# Patient Record
Sex: FEMALE | Race: WHITE | NOT HISPANIC OR LATINO | URBAN - METROPOLITAN AREA
[De-identification: names, ages, dates, MRNs, and addresses within clinical notes are randomized per-mention and may not be internally consistent; named-entity substitution may affect disease eponyms.]

---

## 2017-11-06 ENCOUNTER — OUTPATIENT (OUTPATIENT)
Dept: OUTPATIENT SERVICES | Facility: HOSPITAL | Age: 72
LOS: 1 days | Discharge: HOME | End: 2017-11-06

## 2017-11-06 DIAGNOSIS — I48.0 PAROXYSMAL ATRIAL FIBRILLATION: ICD-10-CM

## 2017-11-06 DIAGNOSIS — Z02.9 ENCOUNTER FOR ADMINISTRATIVE EXAMINATIONS, UNSPECIFIED: ICD-10-CM

## 2019-05-28 PROBLEM — Z00.00 ENCOUNTER FOR PREVENTIVE HEALTH EXAMINATION: Status: ACTIVE | Noted: 2019-05-28

## 2019-06-14 ENCOUNTER — APPOINTMENT (OUTPATIENT)
Dept: CARDIOLOGY | Facility: CLINIC | Age: 74
End: 2019-06-14
Payer: MEDICARE

## 2019-06-14 PROCEDURE — 99214 OFFICE O/P EST MOD 30 MIN: CPT | Mod: 25

## 2019-06-14 PROCEDURE — 93000 ELECTROCARDIOGRAM COMPLETE: CPT

## 2019-08-30 ENCOUNTER — APPOINTMENT (OUTPATIENT)
Dept: CARDIOLOGY | Facility: CLINIC | Age: 74
End: 2019-08-30
Payer: MEDICARE

## 2019-08-30 VITALS
DIASTOLIC BLOOD PRESSURE: 60 MMHG | BODY MASS INDEX: 32.3 KG/M2 | HEART RATE: 67 BPM | SYSTOLIC BLOOD PRESSURE: 108 MMHG | WEIGHT: 201 LBS | HEIGHT: 66 IN

## 2019-08-30 DIAGNOSIS — Z87.39 PERSONAL HISTORY OF OTHER DISEASES OF THE MUSCULOSKELETAL SYSTEM AND CONNECTIVE TISSUE: ICD-10-CM

## 2019-08-30 DIAGNOSIS — E78.5 HYPERLIPIDEMIA, UNSPECIFIED: ICD-10-CM

## 2019-08-30 DIAGNOSIS — Z86.69 PERSONAL HISTORY OF OTHER DISEASES OF THE NERVOUS SYSTEM AND SENSE ORGANS: ICD-10-CM

## 2019-08-30 DIAGNOSIS — Z87.442 PERSONAL HISTORY OF URINARY CALCULI: ICD-10-CM

## 2019-08-30 DIAGNOSIS — E11.9 TYPE 2 DIABETES MELLITUS W/OUT COMPLICATIONS: ICD-10-CM

## 2019-08-30 PROCEDURE — 93000 ELECTROCARDIOGRAM COMPLETE: CPT

## 2019-08-30 PROCEDURE — 99213 OFFICE O/P EST LOW 20 MIN: CPT

## 2019-08-30 RX ORDER — GABAPENTIN 300 MG/1
300 CAPSULE ORAL
Refills: 0 | Status: ACTIVE | COMMUNITY

## 2019-08-30 RX ORDER — GLIMEPIRIDE 2 MG/1
2 TABLET ORAL DAILY
Refills: 0 | Status: ACTIVE | COMMUNITY

## 2019-08-30 RX ORDER — ALLOPURINOL 100 MG/1
100 TABLET ORAL TWICE DAILY
Refills: 0 | Status: ACTIVE | COMMUNITY

## 2019-08-30 RX ORDER — METFORMIN HYDROCHLORIDE 1000 MG/1
1000 TABLET, COATED ORAL
Qty: 30 | Refills: 3 | Status: ACTIVE | COMMUNITY

## 2019-08-30 RX ORDER — LEVOTHYROXINE SODIUM 0.12 MG/1
125 TABLET ORAL DAILY
Refills: 0 | Status: ACTIVE | COMMUNITY

## 2019-08-30 RX ORDER — METOPROLOL TARTRATE 25 MG/1
25 TABLET, FILM COATED ORAL TWICE DAILY
Qty: 180 | Refills: 3 | Status: ACTIVE | COMMUNITY

## 2019-08-30 RX ORDER — ASPIRIN ENTERIC COATED TABLETS 81 MG 81 MG/1
81 TABLET, DELAYED RELEASE ORAL DAILY
Refills: 3 | Status: ACTIVE | COMMUNITY

## 2019-08-30 RX ORDER — SITAGLIPTIN 50 MG/1
50 TABLET, FILM COATED ORAL DAILY
Refills: 0 | Status: ACTIVE | COMMUNITY

## 2019-08-30 RX ORDER — APIXABAN 5 MG/1
5 TABLET, FILM COATED ORAL
Qty: 120 | Refills: 2 | Status: ACTIVE | COMMUNITY

## 2019-08-30 NOTE — ASSESSMENT
[FreeTextEntry1] : Normal LV function now - CM resolved.\par Stress test negative.\par A. flutter. now in NSR.\par Patient reassured. Would keep on the same dose of BB. \par No cardiac contraindication to planned eye surgery.\par C/w Eliquis. Hold for 72 hours pre-op.\par c/w Metoprolol 100 mg q12, ACE-I\par C/w other medications.\par Lipid control\par BP control\par F/u in 2-3 months.

## 2019-08-30 NOTE — PHYSICAL EXAM
[General Appearance - Well Developed] : well developed [Normal Appearance] : normal appearance [Well Groomed] : well groomed [General Appearance - Well Nourished] : well nourished [No Deformities] : no deformities [General Appearance - In No Acute Distress] : no acute distress [Normal Conjunctiva] : the conjunctiva exhibited no abnormalities [Normal Oral Mucosa] : normal oral mucosa [FreeTextEntry1] : no JVD [Respiration, Rhythm And Depth] : normal respiratory rhythm and effort [Auscultation Breath Sounds / Voice Sounds] : lungs were clear to auscultation bilaterally [Heart Rate And Rhythm] : heart rate and rhythm were normal [Heart Sounds] : normal S1 and S2 [Murmurs] : no murmurs present [Edema] : no peripheral edema present [Bowel Sounds] : normal bowel sounds [Abdomen Soft] : soft [Abdomen Tenderness] : non-tender [Abnormal Walk] : normal gait [Nail Clubbing] : no clubbing of the fingernails [Cyanosis, Localized] : no localized cyanosis [Skin Color & Pigmentation] : normal skin color and pigmentation [Oriented To Time, Place, And Person] : oriented to person, place, and time [] : no rash [Affect] : the affect was normal

## 2019-08-30 NOTE — HISTORY OF PRESENT ILLNESS
[FreeTextEntry1] : 75 y/o female with a history of DM, HTN, DL, nephrolithiasis, presents for f/u of  atrial flutter.  Converted to NSR. Echo in January 2018 - normal LV function. No ischemia by stress test - 01/2018. No bleeding with Eliquis. Remains in NSR on metoprolol.  No chest pain but now reports mild dyspnea. No palpitations or syncope. Feels HR is faster when walking. Had left eye surgery, will need right eye done in October.

## 2019-12-06 ENCOUNTER — APPOINTMENT (OUTPATIENT)
Dept: CARDIOLOGY | Facility: CLINIC | Age: 74
End: 2019-12-06
Payer: MEDICARE

## 2019-12-06 VITALS
BODY MASS INDEX: 32.12 KG/M2 | DIASTOLIC BLOOD PRESSURE: 70 MMHG | WEIGHT: 199 LBS | SYSTOLIC BLOOD PRESSURE: 138 MMHG | HEART RATE: 78 BPM

## 2019-12-06 PROCEDURE — 99213 OFFICE O/P EST LOW 20 MIN: CPT

## 2019-12-06 PROCEDURE — 93000 ELECTROCARDIOGRAM COMPLETE: CPT

## 2019-12-06 NOTE — PHYSICAL EXAM
[Normal Appearance] : normal appearance [General Appearance - Well Developed] : well developed [Well Groomed] : well groomed [No Deformities] : no deformities [General Appearance - Well Nourished] : well nourished [Normal Conjunctiva] : the conjunctiva exhibited no abnormalities [General Appearance - In No Acute Distress] : no acute distress [FreeTextEntry1] : no JVD [Heart Rate And Rhythm] : heart rate and rhythm were normal [Normal Oral Mucosa] : normal oral mucosa [Edema] : no peripheral edema present [Heart Sounds] : normal S1 and S2 [Murmurs] : no murmurs present [Bowel Sounds] : normal bowel sounds [Auscultation Breath Sounds / Voice Sounds] : lungs were clear to auscultation bilaterally [Respiration, Rhythm And Depth] : normal respiratory rhythm and effort [Abdomen Tenderness] : non-tender [Abdomen Soft] : soft [Abnormal Walk] : normal gait [Skin Color & Pigmentation] : normal skin color and pigmentation [Nail Clubbing] : no clubbing of the fingernails [Cyanosis, Localized] : no localized cyanosis [Affect] : the affect was normal [Oriented To Time, Place, And Person] : oriented to person, place, and time [] : no rash

## 2019-12-06 NOTE — HISTORY OF PRESENT ILLNESS
[FreeTextEntry1] : 75 y/o female with a history of DM, HTN, DL, nephrolithiasis, presents for f/u of  atrial flutter.  Converted to NSR. Echo in January 2018 - normal LV function. No ischemia by stress test - 01/2018. No bleeding with Eliquis. Remains in NSR on metoprolol.  No chest pain but now reports mild dyspnea. No palpitations or syncope. Had eye surgery, uneventful.

## 2019-12-06 NOTE — ASSESSMENT
[FreeTextEntry1] : Normal LV function now - CM resolved.\par Non-specific ST changes. Asymptomatic, her nuclear stress test was negative.\par A. flutter. now in NSR.\par Patient reassured. Would keep on the same dose of BB. \par \par C/w Eliquis. \par c/w Metoprolol 100 mg q12, ACE-I\par C/w other medications.\par Lipid control\par BP control\par F/u in 6 months.

## 2020-06-05 ENCOUNTER — APPOINTMENT (OUTPATIENT)
Dept: CARDIOLOGY | Facility: CLINIC | Age: 75
End: 2020-06-05
Payer: MEDICARE

## 2020-06-05 VITALS
DIASTOLIC BLOOD PRESSURE: 60 MMHG | BODY MASS INDEX: 32.44 KG/M2 | WEIGHT: 201 LBS | HEART RATE: 70 BPM | SYSTOLIC BLOOD PRESSURE: 136 MMHG

## 2020-06-05 PROCEDURE — 99213 OFFICE O/P EST LOW 20 MIN: CPT

## 2020-06-05 PROCEDURE — 93000 ELECTROCARDIOGRAM COMPLETE: CPT

## 2020-06-05 NOTE — HISTORY OF PRESENT ILLNESS
[FreeTextEntry1] : 73 y/o female with a history of DM, HTN, DL, nephrolithiasis, presents for f/u of  atrial flutter.  Converted to NSR. Echo in January 2018 - normal LV function. No ischemia by stress test - 01/2018. No bleeding with Eliquis. Remains in NSR on metoprolol.  No chest pain but now reports mild dyspnea. No palpitations or syncope.

## 2020-06-05 NOTE — ASSESSMENT
[FreeTextEntry1] : Normal LV function now - CM resolved.\par Non-specific ST changes. Asymptomatic, her nuclear stress test was negative.\par Paroxysmal A. flutter. now in NSR.\par Patient reassured. Would keep on the same dose of BB. \par \par C/w Eliquis. \par c/w Metoprolol 100 mg q12, ACE-I\par C/w other medications.\par Lipid control\par BP control\par echo next year\par F/u in 4-6 months.

## 2020-10-23 ENCOUNTER — APPOINTMENT (OUTPATIENT)
Dept: CARDIOLOGY | Facility: CLINIC | Age: 75
End: 2020-10-23
Payer: MEDICARE

## 2020-10-23 VITALS
HEART RATE: 79 BPM | WEIGHT: 203 LBS | SYSTOLIC BLOOD PRESSURE: 120 MMHG | BODY MASS INDEX: 31.86 KG/M2 | DIASTOLIC BLOOD PRESSURE: 77 MMHG | TEMPERATURE: 97.6 F | HEIGHT: 67 IN

## 2020-10-23 PROCEDURE — 99213 OFFICE O/P EST LOW 20 MIN: CPT

## 2020-10-23 PROCEDURE — 93000 ELECTROCARDIOGRAM COMPLETE: CPT

## 2020-10-23 NOTE — PHYSICAL EXAM
[General Appearance - Well Developed] : well developed [Normal Appearance] : normal appearance [Well Groomed] : well groomed [General Appearance - Well Nourished] : well nourished [No Deformities] : no deformities [General Appearance - In No Acute Distress] : no acute distress [Normal Conjunctiva] : the conjunctiva exhibited no abnormalities [FreeTextEntry1] : no JVD [Respiration, Rhythm And Depth] : normal respiratory rhythm and effort [Auscultation Breath Sounds / Voice Sounds] : lungs were clear to auscultation bilaterally [Heart Rate And Rhythm] : heart rate and rhythm were normal [Heart Sounds] : normal S1 and S2 [Murmurs] : no murmurs present [Edema] : no peripheral edema present [Bowel Sounds] : normal bowel sounds [Abdomen Soft] : soft [Abdomen Tenderness] : non-tender [Abnormal Walk] : normal gait [Nail Clubbing] : no clubbing of the fingernails [Cyanosis, Localized] : no localized cyanosis [Skin Color & Pigmentation] : normal skin color and pigmentation [] : no rash [Oriented To Time, Place, And Person] : oriented to person, place, and time [Affect] : the affect was normal

## 2020-10-23 NOTE — HISTORY OF PRESENT ILLNESS
[FreeTextEntry1] : 5 y/o female with a history of DM, HTN, DL, nephrolithiasis, presents for f/u of  atrial flutter.  Converted to NSR. Echo in January 2018 - normal LV function. No ischemia by stress test - 01/2018. No bleeding with Eliquis. Remains in NSR on metoprolol.  No chest pain but now reports mild dyspnea. No palpitations or syncope.

## 2020-10-23 NOTE — ASSESSMENT
[FreeTextEntry1] : Normal LV function now - CM resolved.\par Non-specific ST changes. Asymptomatic, her nuclear stress test was negative.\par Paroxysmal A. flutter. now in NSR.\par Patient reassured. Would keep on the same dose of BB. \par \par C/w Eliquis. \par c/w Metoprolol 100 mg q12, ACE-I\par C/w other medications.\par Lipid control\par BP control\par echo next year\par F/u in 6 months.

## 2021-04-23 ENCOUNTER — APPOINTMENT (OUTPATIENT)
Dept: CARDIOLOGY | Facility: CLINIC | Age: 76
End: 2021-04-23
Payer: MEDICARE

## 2021-04-23 VITALS
HEIGHT: 67 IN | OXYGEN SATURATION: 98 % | RESPIRATION RATE: 18 BRPM | TEMPERATURE: 97.3 F | HEART RATE: 68 BPM | DIASTOLIC BLOOD PRESSURE: 80 MMHG | WEIGHT: 202 LBS | BODY MASS INDEX: 31.71 KG/M2 | SYSTOLIC BLOOD PRESSURE: 150 MMHG

## 2021-04-23 PROCEDURE — 99214 OFFICE O/P EST MOD 30 MIN: CPT

## 2021-04-23 PROCEDURE — 93000 ELECTROCARDIOGRAM COMPLETE: CPT

## 2021-04-23 NOTE — HISTORY OF PRESENT ILLNESS
[FreeTextEntry1] : 76 y/o female with a history of DM, HTN, DL, nephrolithiasis, presents for f/u of  atrial flutter.  Converted to NSR. Echo in January 2018 - normal LV function. No ischemia by stress test - 01/2018. No bleeding with Eliquis. Remains in NSR on metoprolol.  She reports exertional dyspnea at 1 block, mild chest discomfort. No palpitations or syncope.

## 2021-04-23 NOTE — ASSESSMENT
[FreeTextEntry1] : Normal LV function now - CM resolved.\par Non-specific ST changes. \par Paroxysmal A. flutter. now in NSR.\par Patient reassured. Would keep on the same dose of BB. \par Schedule for nuclear stress test and 2D echo.\par \par C/w Eliquis. \par c/w Metoprolol 100 mg q12, ACE-I\par C/w other medications.\par Lipid control\par BP control\par \par F/u in 6 months.

## 2021-06-02 ENCOUNTER — APPOINTMENT (OUTPATIENT)
Dept: CARDIOLOGY | Facility: CLINIC | Age: 76
End: 2021-06-02
Payer: MEDICARE

## 2021-06-02 VITALS
SYSTOLIC BLOOD PRESSURE: 130 MMHG | OXYGEN SATURATION: 97 % | HEIGHT: 67 IN | BODY MASS INDEX: 31.71 KG/M2 | RESPIRATION RATE: 18 BRPM | HEART RATE: 73 BPM | WEIGHT: 202 LBS | DIASTOLIC BLOOD PRESSURE: 80 MMHG | TEMPERATURE: 97 F

## 2021-06-02 PROCEDURE — 93000 ELECTROCARDIOGRAM COMPLETE: CPT

## 2021-06-02 PROCEDURE — 99214 OFFICE O/P EST MOD 30 MIN: CPT

## 2021-06-02 NOTE — PHYSICAL EXAM
[General Appearance - Well Developed] : well developed [Normal Appearance] : normal appearance [Well Groomed] : well groomed [General Appearance - Well Nourished] : well nourished [No Deformities] : no deformities [General Appearance - In No Acute Distress] : no acute distress [Normal Conjunctiva] : the conjunctiva exhibited no abnormalities [FreeTextEntry1] : no JVD [Respiration, Rhythm And Depth] : normal respiratory rhythm and effort [Auscultation Breath Sounds / Voice Sounds] : lungs were clear to auscultation bilaterally [Heart Rate And Rhythm] : heart rate and rhythm were normal [Heart Sounds] : normal S1 and S2 [Murmurs] : no murmurs present [Edema] : no peripheral edema present [Bowel Sounds] : normal bowel sounds [Abdomen Soft] : soft [Abdomen Tenderness] : non-tender [Abnormal Walk] : normal gait [Nail Clubbing] : no clubbing of the fingernails [Cyanosis, Localized] : no localized cyanosis [Skin Color & Pigmentation] : normal skin color and pigmentation [] : no rash [Affect] : the affect was normal [Oriented To Time, Place, And Person] : oriented to person, place, and time

## 2021-06-02 NOTE — ASSESSMENT
[FreeTextEntry1] : Normal LV function now - CM resolved.\par Non-specific ST changes. \par Paroxysmal A. flutter. now in NSR.\par Patient reassured. Would keep on the same dose of BB. \par Abnormal nuclear stress test c/w LAD disease.\par Findings discussed with the patient.\par Recommend cath - risks and benefits discussed in detail. Schedule for cath.\par In the meantime, I have also advised the patient to go to the nearest emergency room if she experiences any chest pain, dyspnea, syncope, or has any other compelling symptoms.\par \par \par C/w Eliquis. \par c/w Metoprolol 100 mg q12, ACE-I\par Add isosorbide\par C/w other medications.\par Lipid control\par BP control\par \par F/u after the procedure.

## 2021-06-02 NOTE — REASON FOR VISIT
[Arrhythmia/ECG Abnorrmalities] : arrhythmia/ECG abnormalities [Coronary Artery Disease] : coronary artery disease [Follow-Up - Clinic] : a clinic follow-up of [Atrial Fibrillation] : atrial fibrillation

## 2021-06-02 NOTE — HISTORY OF PRESENT ILLNESS
[FreeTextEntry1] : 74 y/o female with a history of DM, HTN, DL, nephrolithiasis, presents for f/u of  atrial flutter and chest pain.  Remains in NSR.  Echo in January 2018 - normal LV function. Large area of ischemia by stress test, c/w LAD disease. No bleeding with Eliquis. Remains in NSR on metoprolol.  She reports exertional dyspnea at 1 block, still has chest discomfort. No palpitations or syncope.

## 2021-06-02 NOTE — REVIEW OF SYSTEMS
[Feeling Fatigued] : feeling fatigued [Dyspnea on exertion] : dyspnea during exertion [Chest Discomfort] : chest discomfort [Negative] : Heme/Lymph

## 2021-06-28 ENCOUNTER — APPOINTMENT (OUTPATIENT)
Dept: DISASTER EMERGENCY | Facility: CLINIC | Age: 76
End: 2021-06-28

## 2021-06-29 LAB — SARS-COV-2 N GENE NPH QL NAA+PROBE: NOT DETECTED

## 2021-07-01 ENCOUNTER — OUTPATIENT (OUTPATIENT)
Dept: OUTPATIENT SERVICES | Facility: HOSPITAL | Age: 76
LOS: 1 days | Discharge: HOME | End: 2021-07-01
Payer: MEDICARE

## 2021-07-01 DIAGNOSIS — Z98.49 CATARACT EXTRACTION STATUS, UNSPECIFIED EYE: Chronic | ICD-10-CM

## 2021-07-01 LAB
ANION GAP SERPL CALC-SCNC: 12 MMOL/L — SIGNIFICANT CHANGE UP (ref 7–14)
BUN SERPL-MCNC: 20 MG/DL — SIGNIFICANT CHANGE UP (ref 10–20)
CALCIUM SERPL-MCNC: 10.5 MG/DL — HIGH (ref 8.5–10.1)
CHLORIDE SERPL-SCNC: 103 MMOL/L — SIGNIFICANT CHANGE UP (ref 98–110)
CO2 SERPL-SCNC: 27 MMOL/L — SIGNIFICANT CHANGE UP (ref 17–32)
CREAT SERPL-MCNC: 1 MG/DL — SIGNIFICANT CHANGE UP (ref 0.7–1.5)
GLUCOSE BLDC GLUCOMTR-MCNC: 127 MG/DL — HIGH (ref 70–99)
GLUCOSE SERPL-MCNC: 136 MG/DL — HIGH (ref 70–99)
HCT VFR BLD CALC: 36.4 % — LOW (ref 37–47)
HCT VFR BLD CALC: 44.5 % — SIGNIFICANT CHANGE UP (ref 37–47)
HGB BLD-MCNC: 12.2 G/DL — SIGNIFICANT CHANGE UP (ref 12–16)
HGB BLD-MCNC: 14.8 G/DL — SIGNIFICANT CHANGE UP (ref 12–16)
MCHC RBC-ENTMCNC: 29.6 PG — SIGNIFICANT CHANGE UP (ref 27–31)
MCHC RBC-ENTMCNC: 29.8 PG — SIGNIFICANT CHANGE UP (ref 27–31)
MCHC RBC-ENTMCNC: 33.3 G/DL — SIGNIFICANT CHANGE UP (ref 32–37)
MCHC RBC-ENTMCNC: 33.5 G/DL — SIGNIFICANT CHANGE UP (ref 32–37)
MCV RBC AUTO: 88.8 FL — SIGNIFICANT CHANGE UP (ref 81–99)
MCV RBC AUTO: 89 FL — SIGNIFICANT CHANGE UP (ref 81–99)
NRBC # BLD: 0 /100 WBCS — SIGNIFICANT CHANGE UP (ref 0–0)
NRBC # BLD: 0 /100 WBCS — SIGNIFICANT CHANGE UP (ref 0–0)
PLATELET # BLD AUTO: 260 K/UL — SIGNIFICANT CHANGE UP (ref 130–400)
PLATELET # BLD AUTO: 333 K/UL — SIGNIFICANT CHANGE UP (ref 130–400)
POTASSIUM SERPL-MCNC: 4.2 MMOL/L — SIGNIFICANT CHANGE UP (ref 3.5–5)
POTASSIUM SERPL-SCNC: 4.2 MMOL/L — SIGNIFICANT CHANGE UP (ref 3.5–5)
RBC # BLD: 4.1 M/UL — LOW (ref 4.2–5.4)
RBC # BLD: 5 M/UL — SIGNIFICANT CHANGE UP (ref 4.2–5.4)
RBC # FLD: 13.9 % — SIGNIFICANT CHANGE UP (ref 11.5–14.5)
RBC # FLD: 14 % — SIGNIFICANT CHANGE UP (ref 11.5–14.5)
SODIUM SERPL-SCNC: 142 MMOL/L — SIGNIFICANT CHANGE UP (ref 135–146)
WBC # BLD: 11.37 K/UL — HIGH (ref 4.8–10.8)
WBC # BLD: 9.33 K/UL — SIGNIFICANT CHANGE UP (ref 4.8–10.8)
WBC # FLD AUTO: 11.37 K/UL — HIGH (ref 4.8–10.8)
WBC # FLD AUTO: 9.33 K/UL — SIGNIFICANT CHANGE UP (ref 4.8–10.8)

## 2021-07-01 PROCEDURE — 92928 PRQ TCAT PLMT NTRAC ST 1 LES: CPT | Mod: LD

## 2021-07-01 PROCEDURE — 93458 L HRT ARTERY/VENTRICLE ANGIO: CPT | Mod: 26,XU

## 2021-07-01 PROCEDURE — 93010 ELECTROCARDIOGRAM REPORT: CPT | Mod: 59

## 2021-07-01 RX ORDER — GABAPENTIN 400 MG/1
1 CAPSULE ORAL
Qty: 0 | Refills: 0 | DISCHARGE

## 2021-07-01 RX ORDER — CLOPIDOGREL BISULFATE 75 MG/1
1 TABLET, FILM COATED ORAL
Qty: 30 | Refills: 0
Start: 2021-07-01 | End: 2021-07-30

## 2021-07-01 RX ORDER — CHOLECALCIFEROL (VITAMIN D3) 125 MCG
0 CAPSULE ORAL
Qty: 0 | Refills: 0 | DISCHARGE

## 2021-07-01 RX ORDER — EZETIMIBE 10 MG/1
1 TABLET ORAL
Qty: 30 | Refills: 0
Start: 2021-07-01 | End: 2021-07-30

## 2021-07-01 RX ORDER — METOPROLOL TARTRATE 50 MG
1 TABLET ORAL
Qty: 0 | Refills: 0 | DISCHARGE

## 2021-07-01 RX ORDER — APIXABAN 2.5 MG/1
1 TABLET, FILM COATED ORAL
Qty: 0 | Refills: 0 | DISCHARGE

## 2021-07-01 RX ORDER — LEVOTHYROXINE SODIUM 125 MCG
1 TABLET ORAL
Qty: 0 | Refills: 0 | DISCHARGE

## 2021-07-01 RX ORDER — ALLOPURINOL 300 MG
1 TABLET ORAL
Qty: 0 | Refills: 0 | DISCHARGE

## 2021-07-01 RX ORDER — METFORMIN HYDROCHLORIDE 850 MG/1
1 TABLET ORAL
Qty: 0 | Refills: 0 | DISCHARGE

## 2021-07-01 RX ORDER — ASPIRIN/CALCIUM CARB/MAGNESIUM 324 MG
1 TABLET ORAL
Qty: 0 | Refills: 0 | DISCHARGE

## 2021-07-01 RX ORDER — GLIMEPIRIDE 1 MG
1 TABLET ORAL
Qty: 0 | Refills: 0 | DISCHARGE

## 2021-07-01 RX ORDER — SITAGLIPTIN 50 MG/1
1 TABLET, FILM COATED ORAL
Qty: 0 | Refills: 0 | DISCHARGE

## 2021-07-01 NOTE — ASU PATIENT PROFILE, ADULT - PMH
Age-related incipient cataract of both eyes    Hypertension, unspecified type    Nephrolithiasis    Type 2 diabetes mellitus without complication, without long-term current use of insulin

## 2021-07-01 NOTE — H&P CARDIOLOGY - HISTORY OF PRESENT ILLNESS
HPI    75 year old lady with PMHx DM, HTN, DLD presenting for [facility] after having episode of AFlutter and NST showing ST depressions in II, III, aVF and V5-V6      REVIEW OF SYSTEMS:  CONSTITUTIONAL: No weakness, fevers or chills  EYES/ENT: No visual changes;  No vertigo or throat pain   NECK: No pain or stiffness  RESPIRATORY: No cough, wheezing, hemoptysis; SEE HPI  CARDIOVASCULAR: SEE HPI  GASTROINTESTINAL: No abdominal or epigastric pain. No nausea, vomiting, or hematemesis; No diarrhea or constipation. No melena or hematochezia.  GENITOURINARY: No dysuria, frequency or hematuria  NEUROLOGICAL: No numbness or weakness  SKIN: No itching, rashes      PHYSICAL EXAM:  T(C): --  HR: --  BP: --  RR: --  SpO2: --  GENERAL: NAD  HEAD:  Atraumatic, Normocephalic  EYES: conjunctiva and sclera clear  NECK: No JVD  CHEST/LUNG: Clear to auscultation bilaterally; No wheeze  HEART: Regular rate and rhythm; No murmurs  ABDOMEN: Soft, Nontender, Nondistended; Bowel sounds present  EXTREMITIES:  2+ Peripheral Pulses, No clubbing, cyanosis, or edema  NEUROLOGY:  A&Ox3, appropriate  SKIN: No rashes or lesions    RIGHT RADIAL ARTERY EVALUATION:  ROBYN TEST: [X] Negative          [] Positive  BARBEAU TEST: [X] Class A           [] Class B           [] Class C            [] Class D         HPI    75 year old lady with PMHx DM, HTN, DLD presenting for [facility] after having episode of AFlutter and NST showing ST depressions in II, III, aVF and V5-V6, positive for anterior ischemia      REVIEW OF SYSTEMS:  CONSTITUTIONAL: No weakness, fevers or chills  EYES/ENT: No visual changes;  No vertigo or throat pain   NECK: No pain or stiffness  RESPIRATORY: No cough, wheezing, hemoptysis; SEE HPI  CARDIOVASCULAR: SEE HPI  GASTROINTESTINAL: No abdominal or epigastric pain. No nausea, vomiting, or hematemesis; No diarrhea or constipation. No melena or hematochezia.  GENITOURINARY: No dysuria, frequency or hematuria  NEUROLOGICAL: No numbness or weakness  SKIN: No itching, rashes      PHYSICAL EXAM:  T(C): --  HR: --  BP: --  RR: --  SpO2: --  GENERAL: NAD  HEAD:  Atraumatic, Normocephalic  EYES: conjunctiva and sclera clear  NECK: No JVD  CHEST/LUNG: Clear to auscultation bilaterally; No wheeze  HEART: Regular rate and rhythm; No murmurs  ABDOMEN: Soft, Nontender, Nondistended; Bowel sounds present  EXTREMITIES:  2+ Peripheral Pulses, No clubbing, cyanosis, or edema  NEUROLOGY:  A&Ox3, appropriate  SKIN: No rashes or lesions    RIGHT RADIAL ARTERY EVALUATION:  BARBEAU TEST: [X] Class A           [] Class B           [] Class C            [] Class D

## 2021-07-01 NOTE — PROGRESS NOTE ADULT - SUBJECTIVE AND OBJECTIVE BOX
Cardiology Follow up    MARYANN LOFTON   76y Female  PAST MEDICAL & SURGICAL HISTORY:  Type 2 diabetes mellitus without complication, without long-term current use of insulin    Hypertension, unspecified type    Nephrolithiasis    Age-related incipient cataract of both eyes    H/O cataract removal with insertion of prosthetic lens         HPI:  HPI    75 year old lady with PMHx DM, HTN, DLD presenting for [facility] after having episode of AFlutter and NST showing ST depressions in II, III, aVF and V5-V6, positive for anterior ischemia      REVIEW OF SYSTEMS:  CONSTITUTIONAL: No weakness, fevers or chills  EYES/ENT: No visual changes;  No vertigo or throat pain   NECK: No pain or stiffness  RESPIRATORY: No cough, wheezing, hemoptysis; SEE HPI  CARDIOVASCULAR: SEE HPI  GASTROINTESTINAL: No abdominal or epigastric pain. No nausea, vomiting, or hematemesis; No diarrhea or constipation. No melena or hematochezia.  GENITOURINARY: No dysuria, frequency or hematuria  NEUROLOGICAL: No numbness or weakness  SKIN: No itching, rashes      PHYSICAL EXAM:  T(C): --  HR: --  BP: --  RR: --  SpO2: --  GENERAL: NAD  HEAD:  Atraumatic, Normocephalic  EYES: conjunctiva and sclera clear  NECK: No JVD  CHEST/LUNG: Clear to auscultation bilaterally; No wheeze  HEART: Regular rate and rhythm; No murmurs  ABDOMEN: Soft, Nontender, Nondistended; Bowel sounds present  EXTREMITIES:  2+ Peripheral Pulses, No clubbing, cyanosis, or edema  NEUROLOGY:  A&Ox3, appropriate  SKIN: No rashes or lesions    RIGHT RADIAL ARTERY EVALUATION:  BARBEAU TEST: [X] Class A           [] Class B           [] Class C            [] Class D         (01 Jul 2021 11:29)    Allergies    Ancef (Anaphylaxis)  Sulfacetamide Sodium (Unknown)    Intolerances    Patient examined in stretcher  Patient without complaints.   Denies CP, SOB, palpitations, or dizziness    Vital Signs   HR: 95  BP: 142/58  RR: 17  SpO2: 95% on RA    MEDICATIONS  (STANDING):    MEDICATIONS  (PRN):      REVIEW OF SYSTEMS:          All negative except as mentioned in HPI    PHYSICAL EXAM:           CONSTITUTIONAL: Well-developed; well-nourished; in no acute distress  	SKIN: warm, dry  	HEAD: Normocephalic; atraumatic  	EYES: PERRL.  	ENT: No nasal discharge, airway clear, mucous membranes moist  	NECK: Supple; non tender.  	CARD: +S1, +S2, no murmurs, gallops, or rubs. irregular rate and rhythm    	RESP: No wheezes, rales or rhonchi. CTA B/L  	ABD: soft ntnd, + BS x 4 quadrants  	EXT: moves all extremities,  no clubbing, cyanosis or edema  	NEURO: Alert and oriented x3, no focal deficits          PSYCH: Cooperative, appropriate          VASCULAR:  + Rad / + PTs / + DPs          EXTREMITY:             Right Radial: D-stat in place, access site soft, no hematoma, no pain, + pulses, no sign of infection, no numbness            ECG: pending    LABS:                        14.8   11.37 )-----------( 333      ( 01 Jul 2021 10:34 )             44.5     07-01    142  |  103  |  20  ----------------------------<  136<H>  4.2   |  27  |  1.0    Ca    10.5<H>      01 Jul 2021 10:34    A/P:  I discussed the case with Cardiologist Dr. Anderson  and recommend the following:    S/P PCI today:    COBRA stent x1 to mLAD    	     Continue DAPT ( Aspirin 81 mg PO Daily and Plavix 75 mg daily ),  B-Blocker, enalapril                   Patient given 30 day supply of ( Aspirin 81 mg daily and Plavix 75 mg daily ) to take at home                   HOLD Eliquis x 1month while on Aspirin/Plavix, after 1 month restart Eliquis and stop Aspirin                   START Zetia 10mg po, patient intolerant to statins                   HOLD metformin x48hrs post cardiac cath                   CBC at 17:00pm                    EKG prior to d/c at 17:00pm                   NS at 100ml ml/hr x 6hrs                   Monitor access site                   Patient agreeing to take DAPT for at least one year or as directed by cardiologist                    Pt given instructions on importance of taking antiplatelet medication or risk acute stent thrombosis/death                   Post cath instructions, access site care and activity restrictions reviewed with patient                     Discussed with patient to return to hospital if experience chest pain, shortness breath, dizziness and site bleeding                   Aggressive risk factor modification, diet counseling, smoking cessation discussed with patient                    Cardiac rehab info provided/referral and communication to cardiac rehab provided                      Can discharge patient at 19:00pm from cardiac standpoint after ambulating without symptoms, access site wnl, labs and ECG reviewed                    Follow up with Cardiology Dr. Anderson in two weeks.  Instructed to call and make an appointment                                       Cardiology Follow up    MARYANN LOFTON   76y Female  PAST MEDICAL & SURGICAL HISTORY:  Type 2 diabetes mellitus without complication, without long-term current use of insulin    Hypertension, unspecified type    Nephrolithiasis    Age-related incipient cataract of both eyes    H/O cataract removal with insertion of prosthetic lens         HPI:  HPI    75 year old lady with PMHx DM, HTN, DLD presenting for [facility] after having episode of AFlutter and NST showing ST depressions in II, III, aVF and V5-V6, positive for anterior ischemia      REVIEW OF SYSTEMS:  CONSTITUTIONAL: No weakness, fevers or chills  EYES/ENT: No visual changes;  No vertigo or throat pain   NECK: No pain or stiffness  RESPIRATORY: No cough, wheezing, hemoptysis; SEE HPI  CARDIOVASCULAR: SEE HPI  GASTROINTESTINAL: No abdominal or epigastric pain. No nausea, vomiting, or hematemesis; No diarrhea or constipation. No melena or hematochezia.  GENITOURINARY: No dysuria, frequency or hematuria  NEUROLOGICAL: No numbness or weakness  SKIN: No itching, rashes      PHYSICAL EXAM:  T(C): --  HR: --  BP: --  RR: --  SpO2: --  GENERAL: NAD  HEAD:  Atraumatic, Normocephalic  EYES: conjunctiva and sclera clear  NECK: No JVD  CHEST/LUNG: Clear to auscultation bilaterally; No wheeze  HEART: Regular rate and rhythm; No murmurs  ABDOMEN: Soft, Nontender, Nondistended; Bowel sounds present  EXTREMITIES:  2+ Peripheral Pulses, No clubbing, cyanosis, or edema  NEUROLOGY:  A&Ox3, appropriate  SKIN: No rashes or lesions    RIGHT RADIAL ARTERY EVALUATION:  BARBEAU TEST: [X] Class A           [] Class B           [] Class C            [] Class D         (01 Jul 2021 11:29)    Allergies    Ancef (Anaphylaxis)  Sulfacetamide Sodium (Unknown)    Intolerances    Patient examined in stretcher  Patient without complaints.   Denies CP, SOB, palpitations, or dizziness    Vital Signs   HR: 95  BP: 142/58  RR: 17  SpO2: 95% on RA    MEDICATIONS  (STANDING):    MEDICATIONS  (PRN):      REVIEW OF SYSTEMS:          All negative except as mentioned in HPI    PHYSICAL EXAM:           CONSTITUTIONAL: Well-developed; well-nourished; in no acute distress  	SKIN: warm, dry  	HEAD: Normocephalic; atraumatic  	EYES: PERRL.  	ENT: No nasal discharge, airway clear, mucous membranes moist  	NECK: Supple; non tender.  	CARD: +S1, +S2, no murmurs, gallops, or rubs. regular rate and rhythm    	RESP: No wheezes, rales or rhonchi. CTA B/L  	ABD: soft ntnd, + BS x 4 quadrants  	EXT: moves all extremities,  no clubbing, cyanosis or edema  	NEURO: Alert and oriented x3, no focal deficits          PSYCH: Cooperative, appropriate          VASCULAR:  + Rad / + PTs / + DPs          EXTREMITY:             Right Radial: D-stat in place, access site soft, no hematoma, no pain, + pulses, no sign of infection, no numbness            ECG: pending    LABS:                        14.8   11.37 )-----------( 333      ( 01 Jul 2021 10:34 )             44.5     07-01    142  |  103  |  20  ----------------------------<  136<H>  4.2   |  27  |  1.0    Ca    10.5<H>      01 Jul 2021 10:34    A/P:  I discussed the case with Cardiologist Dr. Anderson  and recommend the following:    S/P PCI today:    COBRA stent x1 to mLAD    	     Continue DAPT ( Aspirin 81 mg PO Daily and Plavix 75 mg daily ),  B-Blocker, enalapril                   Patient given 30 day supply of ( Aspirin 81 mg daily and Plavix 75 mg daily ) to take at home                   HOLD Eliquis x 1month while on Aspirin/Plavix, after 1 month restart Eliquis and stop Aspirin                   START Zetia 10mg po, patient intolerant to statins                   HOLD metformin x48hrs post cardiac cath                   CBC at 17:00pm                    EKG prior to d/c at 17:00pm                   NS at 100ml ml/hr x 6hrs                   Monitor access site                   Patient agreeing to take DAPT for at least one year or as directed by cardiologist                    Pt given instructions on importance of taking antiplatelet medication or risk acute stent thrombosis/death                   Post cath instructions, access site care and activity restrictions reviewed with patient                     Discussed with patient to return to hospital if experience chest pain, shortness breath, dizziness and site bleeding                   Aggressive risk factor modification, diet counseling, smoking cessation discussed with patient                    Cardiac rehab info provided/referral and communication to cardiac rehab provided                      Can discharge patient at 19:00pm from cardiac standpoint after ambulating without symptoms, access site wnl, labs and ECG reviewed                    Follow up with Cardiology Dr. Anderson in two weeks.  Instructed to call and make an appointment                                       Cardiology Follow up    MARYANN LOFTON   76y Female  PAST MEDICAL & SURGICAL HISTORY:  Type 2 diabetes mellitus without complication, without long-term current use of insulin    Hypertension, unspecified type    Nephrolithiasis    Age-related incipient cataract of both eyes    H/O cataract removal with insertion of prosthetic lens         HPI:  HPI    75 year old lady with PMHx DM, HTN, DLD presenting for [facility] after having episode of AFlutter and NST showing ST depressions in II, III, aVF and V5-V6, positive for anterior ischemia      REVIEW OF SYSTEMS:  CONSTITUTIONAL: No weakness, fevers or chills  EYES/ENT: No visual changes;  No vertigo or throat pain   NECK: No pain or stiffness  RESPIRATORY: No cough, wheezing, hemoptysis; SEE HPI  CARDIOVASCULAR: SEE HPI  GASTROINTESTINAL: No abdominal or epigastric pain. No nausea, vomiting, or hematemesis; No diarrhea or constipation. No melena or hematochezia.  GENITOURINARY: No dysuria, frequency or hematuria  NEUROLOGICAL: No numbness or weakness  SKIN: No itching, rashes      PHYSICAL EXAM:  T(C): --  HR: --  BP: --  RR: --  SpO2: --  GENERAL: NAD  HEAD:  Atraumatic, Normocephalic  EYES: conjunctiva and sclera clear  NECK: No JVD  CHEST/LUNG: Clear to auscultation bilaterally; No wheeze  HEART: Regular rate and rhythm; No murmurs  ABDOMEN: Soft, Nontender, Nondistended; Bowel sounds present  EXTREMITIES:  2+ Peripheral Pulses, No clubbing, cyanosis, or edema  NEUROLOGY:  A&Ox3, appropriate  SKIN: No rashes or lesions    RIGHT RADIAL ARTERY EVALUATION:  BARBEAU TEST: [X] Class A           [] Class B           [] Class C            [] Class D         (01 Jul 2021 11:29)    Allergies    Ancef (Anaphylaxis)  Sulfacetamide Sodium (Unknown)    Intolerances    Patient examined in stretcher  Patient without complaints.   Denies CP, SOB, palpitations, or dizziness    Vital Signs   HR: 95  BP: 142/58  RR: 17  SpO2: 95% on RA    MEDICATIONS  (STANDING):    MEDICATIONS  (PRN):      REVIEW OF SYSTEMS:          All negative except as mentioned in HPI    PHYSICAL EXAM:           CONSTITUTIONAL: Well-developed; well-nourished; in no acute distress  	SKIN: warm, dry  	HEAD: Normocephalic; atraumatic  	EYES: PERRL.  	ENT: No nasal discharge, airway clear, mucous membranes moist  	NECK: Supple; non tender.  	CARD: +S1, +S2, no murmurs, gallops, or rubs. regular rate and rhythm    	RESP: No wheezes, rales or rhonchi. CTA B/L  	ABD: soft ntnd, + BS x 4 quadrants  	EXT: moves all extremities,  no clubbing, cyanosis or edema  	NEURO: Alert and oriented x3, no focal deficits          PSYCH: Cooperative, appropriate          VASCULAR:  + Rad / + PTs / + DPs          EXTREMITY:             Right Radial: D-stat in place, access site soft, no hematoma, no pain, + pulses, no sign of infection, no numbness            ECG: pending    LABS:                        14.8   11.37 )-----------( 333      ( 01 Jul 2021 10:34 )             44.5     07-01    142  |  103  |  20  ----------------------------<  136<H>  4.2   |  27  |  1.0    Ca    10.5<H>      01 Jul 2021 10:34    A/P:  I discussed the case with Cardiologist Dr. Anderson  and recommend the following:    S/P PCI today:    COBRA stent x1 to mLAD    	     Continue DAPT ( Aspirin 81 mg PO Daily and Plavix 75 mg daily ),  B-Blocker, enalapril                   Patient given 30 day supply of ( Aspirin 81 mg daily and Plavix 75 mg daily ) to take at home                   HOLD Eliquis x 1month while on Aspirin/Plavix, after 1 month restart Eliquis and stop Plavix                   START Zetia 10mg po, patient intolerant to statins                   HOLD metformin x48hrs post cardiac cath                   CBC at 17:00pm                    EKG prior to d/c at 17:00pm                   NS at 100ml ml/hr x 6hrs                   Monitor access site                   Patient agreeing to take DAPT for at least one year or as directed by cardiologist                    Pt given instructions on importance of taking antiplatelet medication or risk acute stent thrombosis/death                   Post cath instructions, access site care and activity restrictions reviewed with patient                     Discussed with patient to return to hospital if experience chest pain, shortness breath, dizziness and site bleeding                   Aggressive risk factor modification, diet counseling, smoking cessation discussed with patient                    Cardiac rehab info provided/referral and communication to cardiac rehab provided                      Can discharge patient at 19:00pm from cardiac standpoint after ambulating without symptoms, access site wnl, labs and ECG reviewed                    Follow up with Cardiology Dr. Anderson in two weeks.  Instructed to call and make an appointment                                       Cardiology Follow up    MARYANN LOFTON     76y Female  PAST MEDICAL & SURGICAL HISTORY:  Type 2 diabetes mellitus without complication, without long-term current use of insulin    Hypertension, unspecified type    Nephrolithiasis    Age-related incipient cataract of both eyes    H/O cataract removal with insertion of prosthetic lens         HPI:    HPI    75 year old lady with PMHx DM, HTN, DLD presenting for [facility] after having episode of AFlutter and NST showing ST depressions in II, III, aVF and V5-V6, positive for anterior ischemia      REVIEW OF SYSTEMS:  CONSTITUTIONAL: No weakness, fevers or chills  EYES/ENT: No visual changes;  No vertigo or throat pain   NECK: No pain or stiffness  RESPIRATORY: No cough, wheezing, hemoptysis; SEE HPI  CARDIOVASCULAR: SEE HPI  GASTROINTESTINAL: No abdominal or epigastric pain. No nausea, vomiting, or hematemesis; No diarrhea or constipation. No melena or hematochezia.  GENITOURINARY: No dysuria, frequency or hematuria  NEUROLOGICAL: No numbness or weakness  SKIN: No itching, rashes      PHYSICAL EXAM:  T(C): --  HR: --  BP: --  RR: --  SpO2: --  GENERAL: NAD  HEAD:  Atraumatic, Normocephalic  EYES: conjunctiva and sclera clear  NECK: No JVD  CHEST/LUNG: Clear to auscultation bilaterally; No wheeze  HEART: Regular rate and rhythm; No murmurs  ABDOMEN: Soft, Nontender, Nondistended; Bowel sounds present  EXTREMITIES:  2+ Peripheral Pulses, No clubbing, cyanosis, or edema  NEUROLOGY:  A&Ox3, appropriate  SKIN: No rashes or lesions    RIGHT RADIAL ARTERY EVALUATION:  BARBEAU TEST: [X] Class A           [] Class B           [] Class C            [] Class D         (01 Jul 2021 11:29)    Allergies    Ancef (Anaphylaxis)  Sulfacetamide Sodium (Unknown)    Intolerances    Patient examined in stretcher  Patient without complaints.   Denies CP, SOB, palpitations, or dizziness    Vital Signs   HR: 95  BP: 142/58  RR: 17  SpO2: 95% on RA    MEDICATIONS  (STANDING):    MEDICATIONS  (PRN):      REVIEW OF SYSTEMS:          All negative except as mentioned in HPI    PHYSICAL EXAM:           CONSTITUTIONAL: Well-developed; well-nourished; in no acute distress  	SKIN: warm, dry  	HEAD: Normocephalic; atraumatic  	EYES: PERRL.  	ENT: No nasal discharge, airway clear, mucous membranes moist  	NECK: Supple; non tender.  	CARD: +S1, +S2, no murmurs, gallops, or rubs. regular rate and rhythm    	RESP: No wheezes, rales or rhonchi. CTA B/L  	ABD: soft ntnd, + BS x 4 quadrants  	EXT: moves all extremities,  no clubbing, cyanosis or edema  	NEURO: Alert and oriented x3, no focal deficits          PSYCH: Cooperative, appropriate          VASCULAR:  + Rad / + PTs / + DPs          EXTREMITY:             Right Radial: D-stat in place, access site soft, no hematoma, no pain, + pulses, no sign of infection, no numbness            ECG: pending    LABS:                        14.8   11.37 )-----------( 333      ( 01 Jul 2021 10:34 )             44.5     07-01    142  |  103  |  20  ----------------------------<  136<H>  4.2   |  27  |  1.0    Ca    10.5<H>      01 Jul 2021 10:34    A/P:  I discussed the case with Cardiologist Dr. Anderson  and recommend the following:      S/P PCI today:      COBRA stent x1 to mLAD    	     Continue DAPT ( Aspirin 81 mg PO Daily and Plavix 75 mg daily ),  B-Blocker, enalapril                   Patient given 30 day supply of ( Aspirin 81 mg daily and Plavix 75 mg daily ) to take at home                   HOLD Eliquis x 1month while on Aspirin/Plavix, after 1 month restart Eliquis and stop Plavix                   START Zetia 10mg po, patient intolerant to statins                   HOLD metformin x48hrs post cardiac cath                   CBC at 17:00pm                    EKG prior to d/c at 17:00pm                   NS at 100ml ml/hr x 6hrs                   Monitor access site                   Patient agreeing to take DAPT for at least one year or as directed by cardiologist                    Pt given instructions on importance of taking antiplatelet medication or risk acute stent thrombosis/death                   Post cath instructions, access site care and activity restrictions reviewed with patient                     Discussed with patient to return to hospital if experience chest pain, shortness breath, dizziness and site bleeding                   Aggressive risk factor modification, diet counseling, smoking cessation discussed with patient                    Cardiac rehab info provided/referral and communication to cardiac rehab provided                      Can discharge patient at 19:00pm from cardiac standpoint after ambulating without symptoms, access site wnl, labs and ECG reviewed                    Follow up with Cardiology Dr. Anderson in two weeks.  Instructed to call and make an appointment

## 2021-07-01 NOTE — CHART NOTE - NSCHARTNOTEFT_GEN_A_CORE
Preliminary Cardiac Catheterization Post-Procedure Report    PRE-OP DIAGNOSIS: high risk abnormal stress test    PROCEDURE: Coronary angiogram, C, PCI    Attending: Dr. Anderson   Fellow: Dr. Finley    ANESTHESIA TYPE  [  ]General Anesthesia  [ x ] Sedation  [  x] Local/Regional    ESTIMATED BLOOD LOSS:   < 10 mL    CONDITION  [  ] Critical  [  ] Serious  [  ]Fair  [  x]Good    ACCESS & HEMOSTASIS  [x  ] Right radial  -> Dstat  [  ] Right femoral  [  ] Left radial  [  ] Left femoral       FINDINGS    Hemodynamics: Hemodynamic assessment demonstrates normal LVEDP.   Coronary circulation: The coronary circulation is right dominant. There was significant 3-vessel coronary artery disease (LAD, RCA, and circumflex). Left main: Angiography showed no evidence of disease. LAD: The vessel was large sized. Proximal LAD: There was a discrete 30 % stenosis. Mid LAD: There was a discrete 90 % stenosis at a site with no prior intervention. This lesion is a likely culprit for the patient's abnormal stress test. An intervention was performed. Distal LAD: There was a discrete 80 % stenosis. 1st diagonal: The vessel was small sized. Angiography showed severe atherosclerosis. Proximal circumflex: The vessel was large sized. There was a discrete 50 % stenosis in the middle third of the vessel segment. Distal circumflex: The vessel was small sized. There was a discrete 90 % stenosis at the ostium of the vessel segment. 1st obtuse marginal: The vessel was medium sized. Angiography showed moderate to severe atherosclerosis. Proximal RCA: Angiography showed mild atherosclerosis with no flow limiting lesions. Mid RCA: There was a discrete 30 % stenosis. Distal RCA: Angiography showed mild atherosclerosis with no flow limiting lesions. Right PDA: There was a tubular 90 % stenosis in the proximal third of the vessel segment. Right posterolateral segment: There was a discrete 90 % stenosis in the proximal third of the vessel segment.       PROCEDURE SUMMARY  There is significant triple vessel coronary artery disease.  Successful PCI of mid LAD (AUC score 7).        RECOMMENDATIONS    -IV hydration post procedure   -Aggressive medical therapy including DAPT and risk factor modification  -PCI of RPDA and RPL will be considered in future if angina recurs.    -Out patient follow up with cardiology Preliminary Cardiac Catheterization Post-Procedure Report    PRE-OP DIAGNOSIS: high risk abnormal stress test    PROCEDURE: Coronary angiogram, C, PCI    Attending: Dr. Anderson   Fellow: Dr. Finley    ANESTHESIA TYPE  [  ]General Anesthesia  [ x ] Sedation  [  x] Local/Regional    ESTIMATED BLOOD LOSS:   < 10 mL    CONDITION  [  ] Critical  [  ] Serious  [  ]Fair  [  x]Good    ACCESS & HEMOSTASIS  [x  ] Right radial  -> Dstat  [  ] Right femoral  [  ] Left radial  [  ] Left femoral       FINDINGS    Hemodynamics: Hemodynamic assessment demonstrates normal LVEDP.   Coronary circulation: The coronary circulation is right dominant. There was significant 3-vessel coronary artery disease (LAD, RCA, and circumflex). Left main: Angiography showed no evidence of disease. LAD: The vessel was large sized. Proximal LAD: There was a discrete 30 % stenosis. Mid LAD: There was a discrete 90 % stenosis at a site with no prior intervention. This lesion is a likely culprit for the patient's abnormal stress test. An intervention was performed. Distal LAD: There was a discrete 80 % stenosis. 1st diagonal: The vessel was small sized. Angiography showed severe atherosclerosis. Proximal circumflex: The vessel was large sized. There was a discrete 50 % stenosis in the middle third of the vessel segment. Distal circumflex: The vessel was small sized. There was a discrete 90 % stenosis at the ostium of the vessel segment. 1st obtuse marginal: The vessel was medium sized. Angiography showed moderate to severe atherosclerosis. Proximal RCA: Angiography showed mild atherosclerosis with no flow limiting lesions. Mid RCA: There was a discrete 30 % stenosis. Distal RCA: Angiography showed mild atherosclerosis with no flow limiting lesions. Right PDA: There was a tubular 90 % stenosis in the proximal third of the vessel segment. Right posterolateral segment: There was a discrete 90 % stenosis in the proximal third of the vessel segment.       PROCEDURE SUMMARY  There is significant triple vessel coronary artery disease.  Successful PCI of mid LAD (AUC score 7).        RECOMMENDATIONS    -IV hydration post procedure   -Aggressive medical therapy including DAPT and risk factor modification  -PCI of RPDA and RPL will be considered in future if angina recurs.    -Outpatient follow up with cardiology

## 2021-07-07 DIAGNOSIS — E11.9 TYPE 2 DIABETES MELLITUS WITHOUT COMPLICATIONS: ICD-10-CM

## 2021-07-07 DIAGNOSIS — R94.39 ABNORMAL RESULT OF OTHER CARDIOVASCULAR FUNCTION STUDY: ICD-10-CM

## 2021-07-07 DIAGNOSIS — I25.10 ATHEROSCLEROTIC HEART DISEASE OF NATIVE CORONARY ARTERY WITHOUT ANGINA PECTORIS: ICD-10-CM

## 2021-07-07 DIAGNOSIS — Z79.84 LONG TERM (CURRENT) USE OF ORAL HYPOGLYCEMIC DRUGS: ICD-10-CM

## 2021-07-07 DIAGNOSIS — I10 ESSENTIAL (PRIMARY) HYPERTENSION: ICD-10-CM

## 2021-07-12 PROBLEM — N20.0 CALCULUS OF KIDNEY: Chronic | Status: ACTIVE | Noted: 2021-07-01

## 2021-07-12 PROBLEM — H25.093 OTHER AGE-RELATED INCIPIENT CATARACT, BILATERAL: Chronic | Status: ACTIVE | Noted: 2021-07-01

## 2021-07-12 PROBLEM — I10 ESSENTIAL (PRIMARY) HYPERTENSION: Chronic | Status: ACTIVE | Noted: 2021-07-01

## 2021-07-12 PROBLEM — E11.9 TYPE 2 DIABETES MELLITUS WITHOUT COMPLICATIONS: Chronic | Status: ACTIVE | Noted: 2021-07-01

## 2021-07-23 ENCOUNTER — APPOINTMENT (OUTPATIENT)
Dept: CARDIOLOGY | Facility: CLINIC | Age: 76
End: 2021-07-23
Payer: MEDICARE

## 2021-07-23 VITALS
HEART RATE: 72 BPM | WEIGHT: 199 LBS | HEIGHT: 67 IN | RESPIRATION RATE: 18 BRPM | OXYGEN SATURATION: 97 % | SYSTOLIC BLOOD PRESSURE: 140 MMHG | TEMPERATURE: 96.8 F | BODY MASS INDEX: 31.23 KG/M2 | DIASTOLIC BLOOD PRESSURE: 80 MMHG

## 2021-07-23 PROCEDURE — 93000 ELECTROCARDIOGRAM COMPLETE: CPT

## 2021-07-23 PROCEDURE — 99214 OFFICE O/P EST MOD 30 MIN: CPT

## 2021-07-23 NOTE — ASSESSMENT
[FreeTextEntry1] : CAD, s/p PCI of LAD\par On Plavix now. Will switch back to Eliquis in 1 month. C/w ASA.\par Normal LV function  - CM resolved.\par Hospital records, cath reviewed.\par \par Paroxysmal A. flutter. now in NSR.\par Patient reassured. Would keep on the same dose of BB. \par \par Findings discussed with the patient.\par \par \par \par restart Eliquis in August - will d/c Plavix after 1 month. \par c/w Metoprolol 100 mg q12, ACE-I\par C/w other medications.\par Lipid control\par BP control\par \par F/u in 3 months or if any symptoms.

## 2021-07-23 NOTE — HISTORY OF PRESENT ILLNESS
[FreeTextEntry1] : 75 y/o female with a history of DM, HTN, DL, nephrolithiasis, presents for f/u of paroxysmal atrial flutter and chest pain.  Remains in NSR.  Echo in January 2018 - normal LV function. Large area of ischemia by stress test, c/w LAD disease. Remains in NSR on metoprolol.  Had cardiac cath was found to have severe LAD lesion, which was stented with Cobra (bare metal) stent. She also had disease in the PDA and PLV, which was managed medically. She feels much better now, her dyspnea has improved. No chest pain. The access healed without any problems.

## 2021-10-22 ENCOUNTER — APPOINTMENT (OUTPATIENT)
Dept: CARDIOLOGY | Facility: CLINIC | Age: 76
End: 2021-10-22
Payer: MEDICARE

## 2021-10-22 ENCOUNTER — NON-APPOINTMENT (OUTPATIENT)
Age: 76
End: 2021-10-22

## 2021-10-22 VITALS
TEMPERATURE: 97.7 F | HEIGHT: 67 IN | WEIGHT: 197 LBS | BODY MASS INDEX: 30.92 KG/M2 | HEART RATE: 69 BPM | DIASTOLIC BLOOD PRESSURE: 80 MMHG | SYSTOLIC BLOOD PRESSURE: 130 MMHG

## 2021-10-22 PROCEDURE — 93000 ELECTROCARDIOGRAM COMPLETE: CPT

## 2021-10-22 PROCEDURE — 99214 OFFICE O/P EST MOD 30 MIN: CPT

## 2021-10-22 RX ORDER — ISOSORBIDE MONONITRATE 30 MG/1
30 TABLET, EXTENDED RELEASE ORAL DAILY
Qty: 90 | Refills: 3 | Status: DISCONTINUED | COMMUNITY
Start: 2021-06-09 | End: 2021-10-22

## 2021-10-22 NOTE — HISTORY OF PRESENT ILLNESS
[FreeTextEntry1] : 77 y/o female with a history of DM, HTN, DL, nephrolithiasis, presents for f/u of paroxysmal atrial flutter and chest pain.  Remains in NSR.  Echo in January 2018 - normal LV function. Large area of ischemia by stress test, c/w LAD disease. Remains in NSR on metoprolol.  Had cardiac cath was found to have severe LAD lesion, which was stented with Cobra (bare metal) stent. She also had disease in the PDA and PLV, which was managed medically. She feels much better now, her dyspnea has improved. No chest pain. The access healed without any problems.

## 2021-10-22 NOTE — ASSESSMENT
[FreeTextEntry1] : CAD, s/p PCI of LAD\par Switch back to Eliquis. C/w ASA.\par Normal LV function  - CM resolved.\par Hospital records, cath reviewed.\par \par Paroxysmal A. flutter. now in NSR.\par Patient reassured. Would keep on the same dose of BB. \par \par Findings discussed with the patient.\par \par Off Plavix. \par c/w Metoprolol 100 mg q12, ACE-I\par C/w other medications.\par Lipid control\par BP control\par start Crestor\par \par F/u in 3 months or if any symptoms.

## 2022-02-02 ENCOUNTER — RESULT CHARGE (OUTPATIENT)
Age: 77
End: 2022-02-02

## 2022-02-02 ENCOUNTER — APPOINTMENT (OUTPATIENT)
Dept: CARDIOLOGY | Facility: CLINIC | Age: 77
End: 2022-02-02
Payer: MEDICARE

## 2022-02-02 VITALS
RESPIRATION RATE: 18 BRPM | SYSTOLIC BLOOD PRESSURE: 140 MMHG | TEMPERATURE: 97.2 F | WEIGHT: 193 LBS | HEIGHT: 67 IN | BODY MASS INDEX: 30.29 KG/M2 | HEART RATE: 60 BPM | DIASTOLIC BLOOD PRESSURE: 80 MMHG | OXYGEN SATURATION: 92 %

## 2022-02-02 PROCEDURE — 99213 OFFICE O/P EST LOW 20 MIN: CPT

## 2022-02-02 PROCEDURE — 93000 ELECTROCARDIOGRAM COMPLETE: CPT

## 2022-02-02 NOTE — ASSESSMENT
[FreeTextEntry1] : CAD, s/p PCI of LAD\par Switch back to Eliquis. C/w ASA.\par Normal LV function  - CM resolved.\par Hospital records, cath reviewed.\par \par Paroxysmal A. flutter. now in NSR.\par Patient reassured. Would keep on the same dose of BB. \par \par Findings discussed with the patient.\par \par Off Plavix. \par c/w Metoprolol 100 mg q12, ACE-I\par C/w other medications.\par Lipid control\par BP control\par c/w Crestor - check labs with Dr. Francis\par \par F/u in 3 months or if any symptoms.

## 2022-02-02 NOTE — HISTORY OF PRESENT ILLNESS
[FreeTextEntry1] : 75 y/o female with a history of DM, HTN, DL, nephrolithiasis, presents for f/u of paroxysmal atrial flutter and chest pain.  Remains in NSR.  Echo in January 2018 - normal LV function. Large area of ischemia by stress test, c/w LAD disease. Remains in NSR on metoprolol.  Had cardiac cath was found to have severe LAD lesion, which was stented with Cobra (bare metal) stent. She also had disease in the PDA and PLV, which was managed medically. She feels much better now, her dyspnea has improved. No chest pain. She has decreased ET due to knee pain and problems with balance.

## 2022-06-15 ENCOUNTER — APPOINTMENT (OUTPATIENT)
Dept: CARDIOLOGY | Facility: CLINIC | Age: 77
End: 2022-06-15
Payer: MEDICARE

## 2022-06-15 PROCEDURE — 99214 OFFICE O/P EST MOD 30 MIN: CPT

## 2022-06-15 PROCEDURE — 93000 ELECTROCARDIOGRAM COMPLETE: CPT

## 2022-06-15 NOTE — ASSESSMENT
[FreeTextEntry1] : CAD, s/p PCI of LAD\par c/w Eliquis. C/w ASA. (Off Plavix.)\par Normal LV function  - CM resolved.\par Hospital records, cath reviewed.\par \par Paroxysmal A. flutter. now in NSR.\par Patient reassured. Would keep on the same dose of BB. \par \par Discussed with the patient.\par \par  \par c/w Metoprolol 100 mg q12, ACE-I\par C/w other medications.\par Lipid control\par BP control\par c/w Crestor - check labs with Dr. Francis\par F/u with vascular, ID, wound care.\par \par F/u in 3 months or if any symptoms.

## 2022-06-15 NOTE — PHYSICAL EXAM
[General Appearance - Well Developed] : well developed [Normal Appearance] : normal appearance [Well Groomed] : well groomed [General Appearance - Well Nourished] : well nourished [No Deformities] : no deformities [General Appearance - In No Acute Distress] : no acute distress [Normal Conjunctiva] : the conjunctiva exhibited no abnormalities [Respiration, Rhythm And Depth] : normal respiratory rhythm and effort [Auscultation Breath Sounds / Voice Sounds] : lungs were clear to auscultation bilaterally [Heart Rate And Rhythm] : heart rate and rhythm were normal [Heart Sounds] : normal S1 and S2 [Murmurs] : no murmurs present [Edema] : no peripheral edema present [Bowel Sounds] : normal bowel sounds [Abdomen Tenderness] : non-tender [Abdomen Soft] : soft [Abnormal Walk] : normal gait [Nail Clubbing] : no clubbing of the fingernails [Cyanosis, Localized] : no localized cyanosis [Skin Color & Pigmentation] : normal skin color and pigmentation [] : no rash [Oriented To Time, Place, And Person] : oriented to person, place, and time [Affect] : the affect was normal [FreeTextEntry1] : no JVD

## 2022-06-15 NOTE — HISTORY OF PRESENT ILLNESS
[FreeTextEntry1] : 75 y/o female with a history of DM, HTN, DL, nephrolithiasis, presents for f/u of paroxysmal atrial flutter and chest pain.  Remains in NSR.  Echo in January 2018 - normal LV function. Large area of ischemia by stress test, c/w LAD disease. Remains in NSR on metoprolol.  Had cardiac cath was found to have severe LAD lesion, which was stented with Cobra (bare metal) stent. She also had disease in the PDA and PLV, which was managed medically. She feels much better now, her dyspnea has improved. No chest pain. Unfortunately she developed a wound on her right ankle, osteo, s/p MRI, had vascular surgery for vein ablation. Had long term abs. via PICC line - completed the course. Following with ID, wound care and surgery.

## 2022-07-08 ENCOUNTER — APPOINTMENT (OUTPATIENT)
Dept: CARDIOLOGY | Facility: CLINIC | Age: 77
End: 2022-07-08

## 2022-07-08 ENCOUNTER — RESULT CHARGE (OUTPATIENT)
Age: 77
End: 2022-07-08

## 2022-07-08 VITALS
SYSTOLIC BLOOD PRESSURE: 150 MMHG | DIASTOLIC BLOOD PRESSURE: 80 MMHG | HEIGHT: 67 IN | RESPIRATION RATE: 18 BRPM | OXYGEN SATURATION: 97 % | HEART RATE: 100 BPM | BODY MASS INDEX: 30.29 KG/M2 | WEIGHT: 193 LBS | TEMPERATURE: 97.2 F

## 2022-07-08 PROCEDURE — 99214 OFFICE O/P EST MOD 30 MIN: CPT

## 2022-07-08 PROCEDURE — 93000 ELECTROCARDIOGRAM COMPLETE: CPT

## 2022-07-08 NOTE — ASSESSMENT
[FreeTextEntry1] : Paroxysmal A. flutter. now in NSR. Probably had a recurrent event in the setting of missing BB dose.\par Patient reassured. Would keep on the same dose of BB. restart today.\par Will get an event monitor to assess burden.\par \par CAD, s/p PCI of LAD\par c/w Eliquis. C/w ASA. (Off Plavix.)\par Normal LV function  - CM resolved.\par Hospital records, cath reviewed.\par  \par \par Discussed with the patient.\par \par  \par c/w Metoprolol 100 mg q12, ACE-I\par C/w other medications.\par Lipid control\par BP control\par c/w Crestor - check labs with Dr. Francis\par F/u with vascular, ID, wound care.\par \par F/u in 3 months or if any symptoms.

## 2022-07-08 NOTE — HISTORY OF PRESENT ILLNESS
[FreeTextEntry1] : 76 y/o female with a history of DM, HTN, DL, nephrolithiasis, presents for f/u of paroxysmal atrial flutter and chest pain. She had an episode of tachycardia while [facility], likely had an episode of flutter, but no ECG was done. She reports she ran out of metoprolol, was not take it for a few days.  Back in NSR.  Previously had severe LAD lesion, which was stented with Cobra (bare metal) stent. She also had disease in the PDA and PLV, which was managed medically. She feels much better now, her dyspnea has improved. No chest pain. Following with ID, wound care and surgery for the foot lesion.

## 2022-09-06 ENCOUNTER — RX RENEWAL (OUTPATIENT)
Age: 77
End: 2022-09-06

## 2023-01-04 ENCOUNTER — APPOINTMENT (OUTPATIENT)
Dept: CARDIOLOGY | Facility: CLINIC | Age: 78
End: 2023-01-04
Payer: MEDICARE

## 2023-01-04 VITALS
DIASTOLIC BLOOD PRESSURE: 70 MMHG | BODY MASS INDEX: 30.29 KG/M2 | SYSTOLIC BLOOD PRESSURE: 140 MMHG | OXYGEN SATURATION: 96 % | HEIGHT: 67 IN | WEIGHT: 193 LBS | HEART RATE: 38 BPM | RESPIRATION RATE: 18 BRPM | TEMPERATURE: 96.8 F

## 2023-01-04 PROCEDURE — 99214 OFFICE O/P EST MOD 30 MIN: CPT

## 2023-01-04 PROCEDURE — 93000 ELECTROCARDIOGRAM COMPLETE: CPT

## 2023-01-04 RX ORDER — ENALAPRIL MALEATE 5 MG/1
5 TABLET ORAL
Qty: 90 | Refills: 3 | Status: ACTIVE | COMMUNITY
Start: 1900-01-01 | End: 1900-01-01

## 2023-01-04 NOTE — ASSESSMENT
[FreeTextEntry1] : Paroxysmal A. flutter. now in NSR. \par Would keep on the same dose of BB. \par Negative event monitor.\par \par CAD, s/p PCI of LAD\par c/w Eliquis. C/w ASA. (Off Plavix.)\par Normal LV function  - CM resolved.\par Hospital records, cath reviewed.\par  \par \par Discussed with the patient.\par \par  \par c/w Metoprolol,  increase the dose of ACE-I, given the retinal changes c/w HTN, elevated BP.\par Rational disucssed\par C/w other medications.\par Lipid control\par BP control\par c/w Crestor - labs noted - markedly improved LDL\par F/u with vascular, ID, wound care.\par \par F/u in 6 months or if any symptoms.

## 2023-01-04 NOTE — REASON FOR VISIT
[Post Hospitalization] : a post hospitalization visit [Symptom and Test Evaluation] : symptom and test evaluation [Arrhythmia/ECG Abnorrmalities] : arrhythmia/ECG abnormalities [Coronary Artery Disease] : coronary artery disease

## 2023-01-04 NOTE — HISTORY OF PRESENT ILLNESS
[FreeTextEntry1] : 76 y/o female with a history of DM, HTN, DL, nephrolithiasis, presents for f/u of paroxysmal atrial flutter and chest pain. No further breakthrough episodes with resumption of b-blocker. Remains in NSR.  Previously had severe LAD lesion, which was stented with Cobra (bare metal) stent. She also had disease in the PDA and PLV, which was managed medically. She feels much better now, her dyspnea has improved. No chest pain. Following with ID, wound care and surgery for the foot lesion. She was also seen by ophthalmology for blurry vision and was found to have macular edema, also was reported to have changes related to chronic hypertension. Patient reports her BP is usually normal at home, but she has "white coat HTN". 's today.

## 2023-05-01 NOTE — PHYSICAL EXAM
[General Appearance - Well Developed] : well developed [Normal Appearance] : normal appearance [Well Groomed] : well groomed [General Appearance - Well Nourished] : well nourished [No Deformities] : no deformities [Normal Conjunctiva] : the conjunctiva exhibited no abnormalities [General Appearance - In No Acute Distress] : no acute distress [Normal Oral Mucosa] : normal oral mucosa [FreeTextEntry1] : no JVD [Respiration, Rhythm And Depth] : normal respiratory rhythm and effort [Auscultation Breath Sounds / Voice Sounds] : lungs were clear to auscultation bilaterally [Heart Rate And Rhythm] : heart rate and rhythm were normal (4) no impairment [Murmurs] : no murmurs present [Heart Sounds] : normal S1 and S2 [Edema] : no peripheral edema present [Bowel Sounds] : normal bowel sounds [Abdomen Soft] : soft [Abdomen Tenderness] : non-tender [Abnormal Walk] : normal gait [Nail Clubbing] : no clubbing of the fingernails [Cyanosis, Localized] : no localized cyanosis [Skin Color & Pigmentation] : normal skin color and pigmentation [] : no rash [Oriented To Time, Place, And Person] : oriented to person, place, and time [Affect] : the affect was normal

## 2023-08-23 ENCOUNTER — APPOINTMENT (OUTPATIENT)
Dept: CARDIOLOGY | Facility: CLINIC | Age: 78
End: 2023-08-23
Payer: MEDICARE

## 2023-08-23 ENCOUNTER — NON-APPOINTMENT (OUTPATIENT)
Age: 78
End: 2023-08-23

## 2023-08-23 VITALS
SYSTOLIC BLOOD PRESSURE: 120 MMHG | HEIGHT: 67 IN | WEIGHT: 189 LBS | RESPIRATION RATE: 18 BRPM | BODY MASS INDEX: 29.66 KG/M2 | HEART RATE: 68 BPM | OXYGEN SATURATION: 98 % | TEMPERATURE: 97.8 F | DIASTOLIC BLOOD PRESSURE: 65 MMHG

## 2023-08-23 PROCEDURE — 93000 ELECTROCARDIOGRAM COMPLETE: CPT

## 2023-08-23 PROCEDURE — 99214 OFFICE O/P EST MOD 30 MIN: CPT

## 2023-08-23 NOTE — PHYSICAL EXAM
[General Appearance - Well Developed] : well developed [Normal Appearance] : normal appearance [Well Groomed] : well groomed [General Appearance - Well Nourished] : well nourished [No Deformities] : no deformities [General Appearance - In No Acute Distress] : no acute distress [Normal Conjunctiva] : the conjunctiva exhibited no abnormalities [FreeTextEntry1] : no JVD [Respiration, Rhythm And Depth] : normal respiratory rhythm and effort [Auscultation Breath Sounds / Voice Sounds] : lungs were clear to auscultation bilaterally [Heart Rate And Rhythm] : heart rate and rhythm were normal [Heart Sounds] : normal S1 and S2 [Murmurs] : no murmurs present [Edema] : no peripheral edema present [Bowel Sounds] : normal bowel sounds [Abdomen Soft] : soft [Abdomen Tenderness] : non-tender [Abnormal Walk] : normal gait [Cyanosis, Localized] : no localized cyanosis [Nail Clubbing] : no clubbing of the fingernails [Skin Color & Pigmentation] : normal skin color and pigmentation [] : no rash [Oriented To Time, Place, And Person] : oriented to person, place, and time [Affect] : the affect was normal

## 2023-08-23 NOTE — ASSESSMENT
[FreeTextEntry1] : Paroxysmal A. flutter. now in NSR.  Would keep on the same dose of BB.  Negative event monitor.  CAD, s/p PCI of LAD c/w Eliquis. C/w ASA. (Off Plavix.) Normal LV function  - CM resolved. Hospital records, cath reviewed.    Discussed with the patient.    c/w Metoprolol,  increased the dose of ACE-I last visit, given the retinal changes c/w HTN, elevated BP. C/w other medications. Lipid control BP control c/w Crestor - labs noted - markedly improved LDL F/u with vascular, ID, wound care.  CAD - if stable - c/w medical therapy. If symptoms (chest pain, dyspnea, etc.) - stress MPI.  F/u in 6 months or if any symptoms.

## 2023-08-23 NOTE — HISTORY OF PRESENT ILLNESS
[FreeTextEntry1] : 77 y/o female with a history of DM, HTN, DL, nephrolithiasis, presents for f/u of paroxysmal atrial flutter and chest pain. No further breakthrough episodes with resumption of b-blocker. Remains in NSR.  Previously had severe LAD lesion, which was stented with Cobra (bare metal) stent. She also had disease in the PDA and PLV, which was managed medically. She feels much better now, her dyspnea has improved. No chest pain. S/p surgery for the foot lesion, healing. She was also seen by ophthalmology for blurry vision and was found to have macular edema, also was reported to have changes related to chronic hypertension. Patient reports her BP is usually normal at home, but she has "white coat HTN". 's today.

## 2023-10-23 ENCOUNTER — RX RENEWAL (OUTPATIENT)
Age: 78
End: 2023-10-23

## 2023-11-08 ENCOUNTER — APPOINTMENT (OUTPATIENT)
Dept: UROLOGY | Facility: CLINIC | Age: 78
End: 2023-11-08
Payer: MEDICARE

## 2023-11-08 VITALS
WEIGHT: 189 LBS | DIASTOLIC BLOOD PRESSURE: 72 MMHG | RESPIRATION RATE: 16 BRPM | SYSTOLIC BLOOD PRESSURE: 163 MMHG | OXYGEN SATURATION: 97 % | BODY MASS INDEX: 29.66 KG/M2 | HEART RATE: 74 BPM | HEIGHT: 67 IN

## 2023-11-08 DIAGNOSIS — N39.0 URINARY TRACT INFECTION, SITE NOT SPECIFIED: ICD-10-CM

## 2023-11-08 DIAGNOSIS — R82.71 BACTERIURIA: ICD-10-CM

## 2023-11-08 DIAGNOSIS — R31.0 GROSS HEMATURIA: ICD-10-CM

## 2023-11-08 PROCEDURE — 51798 US URINE CAPACITY MEASURE: CPT

## 2023-11-08 PROCEDURE — 99205 OFFICE O/P NEW HI 60 MIN: CPT

## 2023-11-08 RX ORDER — ESTRADIOL 0.1 MG/G
0.1 CREAM VAGINAL
Qty: 1 | Refills: 2 | Status: ACTIVE | COMMUNITY
Start: 2023-11-08 | End: 1900-01-01

## 2023-11-09 LAB
APPEARANCE: ABNORMAL
BACTERIA: ABNORMAL /HPF
BILIRUBIN URINE: NEGATIVE
BLOOD URINE: ABNORMAL
CAST: 5 /LPF
COLOR: YELLOW
EPITHELIAL CELLS: 2 /HPF
GLUCOSE QUALITATIVE U: NEGATIVE MG/DL
KETONES URINE: NEGATIVE MG/DL
LEUKOCYTE ESTERASE URINE: ABNORMAL
MICROSCOPIC-UA: NORMAL
NITRITE URINE: POSITIVE
PH URINE: 7.5
PROTEIN URINE: 100 MG/DL
RED BLOOD CELLS URINE: 4 /HPF
SPECIFIC GRAVITY URINE: 1.02
UROBILINOGEN URINE: 1 MG/DL
WHITE BLOOD CELLS URINE: 206 /HPF

## 2023-11-13 ENCOUNTER — NON-APPOINTMENT (OUTPATIENT)
Age: 78
End: 2023-11-13

## 2023-11-13 LAB — URINE CYTOLOGY: NORMAL

## 2023-11-15 LAB — BACTERIA UR CULT: ABNORMAL

## 2023-11-24 ENCOUNTER — APPOINTMENT (OUTPATIENT)
Dept: CT IMAGING | Facility: CLINIC | Age: 78
End: 2023-11-24
Payer: MEDICARE

## 2023-11-24 ENCOUNTER — OUTPATIENT (OUTPATIENT)
Dept: OUTPATIENT SERVICES | Facility: HOSPITAL | Age: 78
LOS: 1 days | End: 2023-11-24

## 2023-11-24 DIAGNOSIS — Z98.49 CATARACT EXTRACTION STATUS, UNSPECIFIED EYE: Chronic | ICD-10-CM

## 2023-11-24 PROCEDURE — 74178 CT ABD&PLV WO CNTR FLWD CNTR: CPT | Mod: 26,MH

## 2023-11-28 ENCOUNTER — APPOINTMENT (OUTPATIENT)
Dept: UROLOGY | Facility: CLINIC | Age: 78
End: 2023-11-28
Payer: MEDICARE

## 2023-11-28 VITALS — DIASTOLIC BLOOD PRESSURE: 74 MMHG | HEART RATE: 75 BPM | TEMPERATURE: 97.2 F | SYSTOLIC BLOOD PRESSURE: 173 MMHG

## 2023-11-28 PROCEDURE — 99214 OFFICE O/P EST MOD 30 MIN: CPT

## 2023-12-08 ENCOUNTER — APPOINTMENT (OUTPATIENT)
Dept: UROLOGY | Facility: CLINIC | Age: 78
End: 2023-12-08
Payer: MEDICARE

## 2023-12-08 VITALS
TEMPERATURE: 97.7 F | DIASTOLIC BLOOD PRESSURE: 93 MMHG | HEART RATE: 80 BPM | SYSTOLIC BLOOD PRESSURE: 172 MMHG | OXYGEN SATURATION: 98 %

## 2023-12-08 PROCEDURE — 99214 OFFICE O/P EST MOD 30 MIN: CPT

## 2023-12-08 RX ORDER — NITROGLYCERIN 0.4 MG/1
0.4 TABLET SUBLINGUAL
Qty: 30 | Refills: 1 | Status: DISCONTINUED | COMMUNITY
Start: 2021-06-09 | End: 2023-12-08

## 2024-01-26 ENCOUNTER — APPOINTMENT (OUTPATIENT)
Dept: CARDIOLOGY | Facility: CLINIC | Age: 79
End: 2024-01-26
Payer: MEDICARE

## 2024-01-26 VITALS
BODY MASS INDEX: 29.82 KG/M2 | HEART RATE: 62 BPM | RESPIRATION RATE: 18 BRPM | SYSTOLIC BLOOD PRESSURE: 138 MMHG | WEIGHT: 190 LBS | OXYGEN SATURATION: 62 % | DIASTOLIC BLOOD PRESSURE: 70 MMHG | HEIGHT: 67 IN

## 2024-01-26 PROCEDURE — 93000 ELECTROCARDIOGRAM COMPLETE: CPT

## 2024-01-26 PROCEDURE — 99214 OFFICE O/P EST MOD 30 MIN: CPT

## 2024-01-26 NOTE — PHYSICAL EXAM
[Normal Appearance] : normal appearance [General Appearance - Well Developed] : well developed [Well Groomed] : well groomed [General Appearance - Well Nourished] : well nourished [No Deformities] : no deformities [General Appearance - In No Acute Distress] : no acute distress [Normal Conjunctiva] : the conjunctiva exhibited no abnormalities [Respiration, Rhythm And Depth] : normal respiratory rhythm and effort [Auscultation Breath Sounds / Voice Sounds] : lungs were clear to auscultation bilaterally [Heart Rate And Rhythm] : heart rate and rhythm were normal [Murmurs] : no murmurs present [Heart Sounds] : normal S1 and S2 [Edema] : no peripheral edema present [Bowel Sounds] : normal bowel sounds [Abdomen Soft] : soft [Abdomen Tenderness] : non-tender [Abnormal Walk] : normal gait [Nail Clubbing] : no clubbing of the fingernails [Cyanosis, Localized] : no localized cyanosis [Skin Color & Pigmentation] : normal skin color and pigmentation [] : no rash [Oriented To Time, Place, And Person] : oriented to person, place, and time [Affect] : the affect was normal [FreeTextEntry1] : NC/AT

## 2024-01-26 NOTE — HISTORY OF PRESENT ILLNESS
[FreeTextEntry1] : 79 y/o female with a history of DM, HTN, DL, nephrolithiasis, presents for f/u of paroxysmal atrial flutter and chest pain. She had an episode of tachycardia while at [facility] - 's, likely had an episode of flutter, but no ECG was done. She reports she ran out of metoprolol, was not take it for a few days.  Back in NSR.  Previously had severe LAD lesion, which was stented with Cobra (bare metal) stent. She also had disease in the PDA and PLV, which was managed medically. She reports dyspnea on exertion. Atypical chest pain.

## 2024-01-26 NOTE — ASSESSMENT
[FreeTextEntry1] : Paroxysmal A. flutter. now in NSR. Would keep on the same dose of BB. Negative event monitor.  CAD, s/p PCI of LAD c/w Eliquis. C/w ASA. (Off Plavix.) Normal LV function - CM resolved. Hospital records, cath reviewed. AKHATR, CP - schedule stress test (pharmacological - cannot exercise)   Discussed with the patient.   c/w Metoprolol, increased the dose of ACE-I last visit, given the retinal changes c/w HTN, elevated BP. C/w other medications. Lipid control BP control c/w Crestor - labs noted - markedly improved LDL F/u with Dr. Francis.  CAD - if stable - c/w medical therapy. F/u stress MPI.  F/u in 6 months or if any symptoms.

## 2024-02-05 ENCOUNTER — NON-APPOINTMENT (OUTPATIENT)
Age: 79
End: 2024-02-05

## 2024-02-16 ENCOUNTER — APPOINTMENT (OUTPATIENT)
Dept: UROLOGY | Facility: CLINIC | Age: 79
End: 2024-02-16
Payer: MEDICARE

## 2024-02-16 PROCEDURE — 99443: CPT | Mod: 93

## 2024-02-16 NOTE — HISTORY OF PRESENT ILLNESS
[FreeTextEntry1] : Name MARYANN LOFTON MRN [id number]  1945 ------------------------------------------------------------------------------------------------------------------------------------------- Date of First Visit: 2023 Referring Provider/PCP: Dr. Sergio Francis ------------------------------------------------------------------------------------------------------------------------------------------- CC: kidney stones  History of Present Illness: MARYANN LOFTON is a 78-year-old female with PMH of HTN, DM, HLD, hypothyroid, CAD with stents 1.5 years ago (on Eliquis and Aspirin) who presents for evaluation of kidney stones. She was initially evaluated by Dr. Mejia for possible UTI. Often notices her urine turns a greenish color and is cloudy and was often treated for a presumed UTI by her PCP, though rarely had urine testing and does not have other urinary symptoms. She has a long history of kidney stones. First stone in the  complicated by urosepsis. Has had several procedures, notes she has had PCNLs and at least 8 SWL, some of which have failed. Notes she has had several Litholinks, last one about 5-6 years ago. She notes she has symptoms from ureteral stents, particular ureteral spasms and flank pain.  Imaging: CT scan from 2023 can be found in [facility] PACS. Findings: Bilateral non-obstructing nephrolithiasis. The largest calculus is located in the left lower pole, crescent-shaped, measuring approximately 1.5 x 2.0 cm in the axial plane. No renal collecting system obstruction bilaterally. 3.0 cm simple cyst upper pole right kidney. Peripherally calcified but otherwise simple cyst interpolar region left kidney, measuring approximately 6.1 cm. No enhancing renal masses bilaterally. The ureters exhibited normal course and caliber to the level of the bladder.  Previous urine cultures: 2023: >100,000 CFU/ml Proteus mirabilis  Kidney Stone History: First-time stone former - No Concurrent asymptomatic stone(s) - Yes Previous stone surgeries - Several SWL, and at least 1 PCNL Previous passed stones - Yes Comorbidities - diabetes Previous metabolic evaluation - Yes - unsure of results ------------------------------------------------------------------------------------------------------------------------------------------- Interval History (2024): Doing well, though has not made a decision on stone management as she has been undergoing cardiac evaluation as well as other unspecified evaluation based on her recent CT findings - presumably GYN-related.

## 2024-02-16 NOTE — ASSESSMENT
[FreeTextEntry1] : Assessment: MARYANN LOFTON is an 77 y/o with large stone burden in the left kidney.  Plan: -Considering two-staged left ureteroscopy with laser lithotripsy +/- CVAC, about 1 week apart. -Actively undergoing cardiac evaluation and other medical evaluations  -Would have to hold Eliquis, Januvia. -TTM follow up in early March

## 2024-03-01 ENCOUNTER — APPOINTMENT (OUTPATIENT)
Dept: CARDIOLOGY | Facility: CLINIC | Age: 79
End: 2024-03-01
Payer: MEDICARE

## 2024-03-01 PROCEDURE — 93306 TTE W/DOPPLER COMPLETE: CPT

## 2024-03-01 PROCEDURE — 99214 OFFICE O/P EST MOD 30 MIN: CPT | Mod: 25

## 2024-03-01 NOTE — REASON FOR VISIT
[Arrhythmia/ECG Abnorrmalities] : arrhythmia/ECG abnormalities [Symptom and Test Evaluation] : symptom and test evaluation [Coronary Artery Disease] : coronary artery disease

## 2024-03-01 NOTE — ASSESSMENT
[FreeTextEntry1] : Paroxysmal A. flutter. now in NSR. Would keep on the same dose of BB. Negative event monitor.  CAD, s/p PCI of LAD c/w Eliquis. C/w ASA. (Off Plavix.) Normal LV function - CM resolved. Hospital records, cath reviewed. pharmacological stress test c/w mild anterior defect. C/w medical management for now. If worsening symptoms, will consider cath. Aortic stenosis, GABY 0.8, but gradient and 2D imaging c/w moderate stenosis. C/w conservative management. If worsening symptoms will evaluate for TAVR. Pulmonary HTN, likely secondary to CHF and pulmonary disease. Consider pulmonary evaluation, home sleep study.  Discussed with the patient.   c/w Metoprolol, increased the dose of ACE-I last visit, given the retinal changes c/w HTN, elevated BP. C/w other medications. Lipid control BP control c/w Crestor - labs noted - markedly improved LDL F/u with Dr. Francis.  CAD - if stable - c/w medical therapy.  F/u in 3 months or if any symptoms.

## 2024-03-01 NOTE — PHYSICAL EXAM
[General Appearance - Well Developed] : well developed [Well Groomed] : well groomed [Normal Appearance] : normal appearance [General Appearance - Well Nourished] : well nourished [No Deformities] : no deformities [Normal Conjunctiva] : the conjunctiva exhibited no abnormalities [General Appearance - In No Acute Distress] : no acute distress [Respiration, Rhythm And Depth] : normal respiratory rhythm and effort [FreeTextEntry1] : no JVD [Auscultation Breath Sounds / Voice Sounds] : lungs were clear to auscultation bilaterally [Heart Rate And Rhythm] : heart rate and rhythm were normal [Edema] : no peripheral edema present [Heart Sounds] : normal S1 and S2 [Systolic grade ___/6] : A grade [unfilled]/6 systolic murmur was heard. [Abdomen Tenderness] : non-tender [Abdomen Soft] : soft [Bowel Sounds] : normal bowel sounds [Abnormal Walk] : normal gait [Nail Clubbing] : no clubbing of the fingernails [Skin Color & Pigmentation] : normal skin color and pigmentation [Cyanosis, Localized] : no localized cyanosis [Affect] : the affect was normal [] : no rash [Oriented To Time, Place, And Person] : oriented to person, place, and time

## 2024-03-01 NOTE — HISTORY OF PRESENT ILLNESS
[FreeTextEntry1] : 79 y/o female with a history of DM, HTN, DL, nephrolithiasis, presents for f/u of paroxysmal atrial flutter and chest pain. She had an episode of tachycardia while at [facility] - 's, likely had an episode of flutter, but no ECG was done. She reports she ran out of metoprolol, was not take it for a few days.  Back in NSR.  Previously had severe LAD lesion, which was stented with Cobra (bare metal) stent. She also had disease in the PDA and PLV, which was managed medically. She reports dyspnea on exertion. Atypical chest pain. Nuclear stress test revealed mild anterior defect. Echo today - normal wall motion, moderate MR, moderate AS, moderate pulmonary HTN. Symptoms are stable.

## 2024-03-08 ENCOUNTER — APPOINTMENT (OUTPATIENT)
Dept: UROLOGY | Facility: CLINIC | Age: 79
End: 2024-03-08
Payer: MEDICARE

## 2024-03-08 PROCEDURE — 99443: CPT | Mod: 93

## 2024-03-08 NOTE — ASSESSMENT
[FreeTextEntry1] : Assessment: MARYANN LOFTON is an 77 y/o with large stone burden in the left kidney. Has numerous ongoing medical issues requiring evaluation.   Plan: -Surveillance for now -She's considering two-staged left ureteroscopy with laser lithotripsy +/- CVAC, about 1 week apart, if to have any treatment at all -Actively undergoing cardiac evaluation and other medical evaluations -Would have to hold Eliquis, Januvia. -She will contact us when completed with other evaluations

## 2024-03-08 NOTE — HISTORY OF PRESENT ILLNESS
[FreeTextEntry1] : Name MARYANN LOFTON MRN [id number]  1945 ------------------------------------------------------------------------------------------------------------------------------------------- Date of First Visit: 2023 Referring Provider/PCP: Dr. Sergio Francis ------------------------------------------------------------------------------------------------------------------------------------------- CC: kidney stones  History of Present Illness: MARYANN LOFTON is a 78-year-old female with PMH of HTN, DM, HLD, hypothyroid, CAD with stents 1.5 years ago (on Eliquis and Aspirin) who presents for evaluation of kidney stones. She was initially evaluated by Dr. Meija for possible UTI. Often notices her urine turns a greenish color and is cloudy and was often treated for a presumed UTI by her PCP, though rarely had urine testing and does not have other urinary symptoms. She has a long history of kidney stones. First stone in the  complicated by urosepsis. Has had several procedures, notes she has had PCNLs and at least 8 SWL, some of which have failed. Notes she has had several Litholinks, last one about 5-6 years ago. She notes she has symptoms from ureteral stents, particular ureteral spasms and flank pain.  Imaging: CT scan from 2023 can be found in [facility] PACS. Findings: Bilateral non-obstructing nephrolithiasis. The largest calculus is located in the left lower pole, crescent-shaped, measuring approximately 1.5 x 2.0 cm in the axial plane. No renal collecting system obstruction bilaterally. 3.0 cm simple cyst upper pole right kidney. Peripherally calcified but otherwise simple cyst interpolar region left kidney, measuring approximately 6.1 cm. No enhancing renal masses bilaterally. The ureters exhibited normal course and caliber to the level of the bladder.  Previous urine cultures: 2023: >100,000 CFU/ml Proteus mirabilis  Kidney Stone History: First-time stone former - No Concurrent asymptomatic stone(s) - Yes Previous stone surgeries - Several SWL, and at least 1 PCNL Previous passed stones - Yes Comorbidities - diabetes Previous metabolic evaluation - Yes - unsure of results ------------------------------------------------------------------------------------------------------------------------------------------- Interval History (2024): Doing well, though has not made a decision on stone management as she has been undergoing cardiac evaluation as well as other unspecified evaluation based on her recent CT findings - presumably GYN-related. ------------------------------------------------------------------------------------------------------------------------------------------- Interval History (2024): Tests during February begot more testing - has "2 leaking valves" in the heart. Also needs to have cervical biopsies.

## 2024-05-15 ENCOUNTER — APPOINTMENT (OUTPATIENT)
Dept: CARDIOLOGY | Facility: CLINIC | Age: 79
End: 2024-05-15
Payer: MEDICARE

## 2024-05-15 ENCOUNTER — RESULT CHARGE (OUTPATIENT)
Age: 79
End: 2024-05-15

## 2024-05-15 VITALS
WEIGHT: 194 LBS | TEMPERATURE: 96 F | HEIGHT: 67 IN | SYSTOLIC BLOOD PRESSURE: 150 MMHG | BODY MASS INDEX: 30.45 KG/M2 | DIASTOLIC BLOOD PRESSURE: 80 MMHG | RESPIRATION RATE: 18 BRPM | HEART RATE: 66 BPM | OXYGEN SATURATION: 91 %

## 2024-05-15 DIAGNOSIS — I25.10 ATHEROSCLEROTIC HEART DISEASE OF NATIVE CORONARY ARTERY W/OUT ANGINA PECTORIS: ICD-10-CM

## 2024-05-15 DIAGNOSIS — I35.0 NONRHEUMATIC AORTIC (VALVE) STENOSIS: ICD-10-CM

## 2024-05-15 DIAGNOSIS — I10 ESSENTIAL (PRIMARY) HYPERTENSION: ICD-10-CM

## 2024-05-15 DIAGNOSIS — Z01.818 ENCOUNTER FOR OTHER PREPROCEDURAL EXAMINATION: ICD-10-CM

## 2024-05-15 DIAGNOSIS — I48.92 UNSPECIFIED ATRIAL FLUTTER: ICD-10-CM

## 2024-05-15 PROCEDURE — 93000 ELECTROCARDIOGRAM COMPLETE: CPT

## 2024-05-15 PROCEDURE — 99214 OFFICE O/P EST MOD 30 MIN: CPT

## 2024-05-15 PROCEDURE — G2211 COMPLEX E/M VISIT ADD ON: CPT

## 2024-05-15 NOTE — PHYSICAL EXAM
[General Appearance - Well Developed] : well developed [Normal Appearance] : normal appearance [Well Groomed] : well groomed [General Appearance - Well Nourished] : well nourished [No Deformities] : no deformities [General Appearance - In No Acute Distress] : no acute distress [Normal Conjunctiva] : the conjunctiva exhibited no abnormalities [FreeTextEntry1] : no JVD [Respiration, Rhythm And Depth] : normal respiratory rhythm and effort [Auscultation Breath Sounds / Voice Sounds] : lungs were clear to auscultation bilaterally [Heart Rate And Rhythm] : heart rate and rhythm were normal [Heart Sounds] : normal S1 and S2 [Edema] : no peripheral edema present [Systolic grade ___/6] : A grade [unfilled]/6 systolic murmur was heard. [Bowel Sounds] : normal bowel sounds [Abdomen Soft] : soft [Abdomen Tenderness] : non-tender [Abnormal Walk] : normal gait [Nail Clubbing] : no clubbing of the fingernails [Cyanosis, Localized] : no localized cyanosis [Skin Color & Pigmentation] : normal skin color and pigmentation [] : no rash [Oriented To Time, Place, And Person] : oriented to person, place, and time [Affect] : the affect was normal

## 2024-05-15 NOTE — ASSESSMENT
[FreeTextEntry1] : Paroxysmal A. flutter. now in NSR. Would keep on the same dose of BB. Negative event monitor.  CAD, s/p PCI of LAD c/w Eliquis. C/w ASA. (Off Plavix.) Normal LV function - CM resolved. Hospital records, cath reviewed. pharmacological stress test c/w mild anterior defect. C/w medical management for now. If worsening symptoms, will consider cath. Aortic stenosis, GABY 0.8, but gradient and 2D imaging c/w moderate stenosis. C/w conservative management. If worsening symptoms will evaluate for TAVR. Pulmonary HTN, likely secondary to CHF and pulmonary disease. Consider pulmonary evaluation, home sleep study. ET over 4 METs.  Clear for planned procedure as intermediate risk patient for low risk procedure. Discussed with the patient.   c/w Metoprolol, increased the dose of ACE-I last visit, given the retinal changes c/w HTN, elevated BP. C/w BID, monitor BP. C/w other medications. Lipid control BP control c/w Crestor - labs noted - markedly improved LDL  F/u with Dr. Francis.  CAD - if stable - c/w medical therapy.  F/u in 3 months or if any symptoms.

## 2024-05-15 NOTE — HISTORY OF PRESENT ILLNESS
[FreeTextEntry1] : 77 y/o female with a history of DM, HTN, DL, nephrolithiasis, presents for f/u of paroxysmal atrial flutter and chest pain. She had an episode of tachycardia while at [facility], likely had an episode of flutter, but no ECG was done. She reports she ran out of metoprolol, was not take it for a few days.  Back in NSR.  Previously had severe LAD lesion, which was stented with Cobra (bare metal) stent. She also had disease in the PDA and PLV, which was managed medically. She reports dyspnea on exertion. Atypical chest pain. Nuclear stress test revealed mild anterior defect. Echo last visit - normal wall motion, moderate MR, moderate AS, moderate pulmonary HTN. Symptoms are stable. Able to perform 4 METs. Needs clearance for uteroscopy, uterine polypectomy.

## 2024-06-20 ENCOUNTER — RX RENEWAL (OUTPATIENT)
Age: 79
End: 2024-06-20

## 2024-06-20 RX ORDER — ROSUVASTATIN CALCIUM 10 MG/1
10 TABLET, FILM COATED ORAL DAILY
Qty: 90 | Refills: 3 | Status: ACTIVE | COMMUNITY
Start: 2021-10-22 | End: 1900-01-01

## 2024-07-15 NOTE — ASU PATIENT PROFILE, ADULT - PRO ARRIVE FROM
Call Center TCM Note      Flowsheet Row Responses   List of hospitals in [place] patient discharged from? [facility]   Does the patient have one of the following disease processes/diagnoses(primary or secondary)? Other   TCM attempt successful? Yes   Call start time 1413   Call end time 1415   Discharge diagnosis Acute cholecystitis-EGD this visit   Meds reviewed with patient/caregiver? Yes   Is the patient having any side effects they believe may be caused by any medication additions or changes? No   Does the patient have all medications ordered at discharge? Yes   Is the patient taking all medications as directed (includes completed medication regime)? Yes   Comments Hosp dc fu apt on 7/25/24 with PCP   Does the patient have an appointment with their PCP within 7-14 days of discharge? Yes   What is the Home health agency?  [facility]   Has home health visited the patient within 72 hours of discharge? Yes   Home health comments HH visited pt today (7/15/24)   Did the patient receive a copy of their discharge instructions? Yes   Nursing interventions Reviewed instructions with patient   What is the patient's perception of their health status since discharge? Improving   Is the patient/caregiver able to teach back signs and symptoms related to disease process for when to call PCP? Yes   Is the patient/caregiver able to teach back signs and symptoms related to disease process for when to call 911? Yes   Is the patient/caregiver able to teach back the hierarchy of who to call/visit for symptoms/problems? PCP, Specialist, Home health nurse, Urgent Care, ED, 911 Yes   If the patient is a current smoker, are they able to teach back resources for cessation? Not a smoker   TCM call completed? Yes   Call end time 1415            Sherin Oconnor RN    7/15/2024, 14:15 EDT         home

## 2024-09-27 ENCOUNTER — APPOINTMENT (OUTPATIENT)
Dept: CARDIOLOGY | Facility: CLINIC | Age: 79
End: 2024-09-27
Payer: MEDICARE

## 2024-09-27 VITALS
TEMPERATURE: 95.8 F | HEART RATE: 60 BPM | RESPIRATION RATE: 18 BRPM | OXYGEN SATURATION: 90 % | HEIGHT: 67 IN | BODY MASS INDEX: 30.13 KG/M2 | SYSTOLIC BLOOD PRESSURE: 140 MMHG | WEIGHT: 192 LBS | DIASTOLIC BLOOD PRESSURE: 70 MMHG

## 2024-09-27 DIAGNOSIS — R55 SYNCOPE AND COLLAPSE: ICD-10-CM

## 2024-09-27 DIAGNOSIS — I10 ESSENTIAL (PRIMARY) HYPERTENSION: ICD-10-CM

## 2024-09-27 DIAGNOSIS — I35.0 NONRHEUMATIC AORTIC (VALVE) STENOSIS: ICD-10-CM

## 2024-09-27 DIAGNOSIS — I48.92 UNSPECIFIED ATRIAL FLUTTER: ICD-10-CM

## 2024-09-27 DIAGNOSIS — I25.10 ATHEROSCLEROTIC HEART DISEASE OF NATIVE CORONARY ARTERY W/OUT ANGINA PECTORIS: ICD-10-CM

## 2024-09-27 PROCEDURE — 93000 ELECTROCARDIOGRAM COMPLETE: CPT

## 2024-09-27 PROCEDURE — G2211 COMPLEX E/M VISIT ADD ON: CPT

## 2024-09-27 PROCEDURE — 99214 OFFICE O/P EST MOD 30 MIN: CPT

## 2024-09-27 NOTE — HISTORY OF PRESENT ILLNESS
[FreeTextEntry1] : 78 y/o female with a history of DM, HTN, DL, nephrolithiasis, presents for f/u of paroxysmal atrial flutter and chest pain. She had an episode of tachycardia while at [facility], likely had an episode of flutter, but no ECG was done. She reports she ran out of metoprolol, was not take it for a few days.  Back in NSR.  Previously had severe LAD lesion, which was stented with Cobra (bare metal) stent. She also had disease in the PDA and PLV, which was managed medically. She reports dyspnea on exertion. Atypical chest pain. Nuclear stress test revealed mild anterior defect. Echo last visit - normal wall motion, moderate MR, moderate AS, moderate pulmonary HTN. Symptoms are stable. Able to perform 4 METs. She reports her dyspnea with exertion is worse, she gest out of breath after walking about a block. She had a near-syncope recently while at the store, felt lightheaded and nearly fell, but recovered.

## 2024-09-27 NOTE — PHYSICAL EXAM
[General Appearance - Well Developed] : well developed [Normal Appearance] : normal appearance [Well Groomed] : well groomed [General Appearance - Well Nourished] : well nourished [No Deformities] : no deformities [General Appearance - In No Acute Distress] : no acute distress [Normal Conjunctiva] : the conjunctiva exhibited no abnormalities [Respiration, Rhythm And Depth] : normal respiratory rhythm and effort [Auscultation Breath Sounds / Voice Sounds] : lungs were clear to auscultation bilaterally [Heart Rate And Rhythm] : heart rate and rhythm were normal [Heart Sounds] : normal S1 and S2 [Edema] : no peripheral edema present [Systolic grade ___/6] : A grade [unfilled]/6 systolic murmur was heard. [Bowel Sounds] : normal bowel sounds [Abdomen Soft] : soft [Abdomen Tenderness] : non-tender [Abnormal Walk] : normal gait [Nail Clubbing] : no clubbing of the fingernails [Cyanosis, Localized] : no localized cyanosis [Skin Color & Pigmentation] : normal skin color and pigmentation [] : no rash [Oriented To Time, Place, And Person] : oriented to person, place, and time [Affect] : the affect was normal [FreeTextEntry1] : no JVD

## 2024-10-18 ENCOUNTER — NON-APPOINTMENT (OUTPATIENT)
Age: 79
End: 2024-10-18

## 2024-11-08 ENCOUNTER — APPOINTMENT (OUTPATIENT)
Dept: CARDIOLOGY | Facility: CLINIC | Age: 79
End: 2024-11-08
Payer: MEDICARE

## 2024-11-08 DIAGNOSIS — I25.10 ATHEROSCLEROTIC HEART DISEASE OF NATIVE CORONARY ARTERY W/OUT ANGINA PECTORIS: ICD-10-CM

## 2024-11-08 DIAGNOSIS — I48.92 UNSPECIFIED ATRIAL FLUTTER: ICD-10-CM

## 2024-11-08 DIAGNOSIS — I35.0 NONRHEUMATIC AORTIC (VALVE) STENOSIS: ICD-10-CM

## 2024-11-08 PROCEDURE — 99214 OFFICE O/P EST MOD 30 MIN: CPT | Mod: 25

## 2024-11-08 PROCEDURE — 93306 TTE W/DOPPLER COMPLETE: CPT

## 2024-11-08 RX ORDER — NITROGLYCERIN 0.4 MG/1
0.4 TABLET SUBLINGUAL
Qty: 25 | Refills: 1 | Status: ACTIVE | COMMUNITY
Start: 2024-11-08 | End: 1900-01-01

## 2025-01-10 ENCOUNTER — APPOINTMENT (OUTPATIENT)
Dept: CARDIOLOGY | Facility: CLINIC | Age: 80
End: 2025-01-10
Payer: MEDICARE

## 2025-01-10 ENCOUNTER — NON-APPOINTMENT (OUTPATIENT)
Age: 80
End: 2025-01-10

## 2025-01-10 VITALS
HEIGHT: 67 IN | OXYGEN SATURATION: 95 % | HEART RATE: 68 BPM | WEIGHT: 192 LBS | BODY MASS INDEX: 30.13 KG/M2 | RESPIRATION RATE: 18 BRPM | SYSTOLIC BLOOD PRESSURE: 140 MMHG | DIASTOLIC BLOOD PRESSURE: 70 MMHG

## 2025-01-10 DIAGNOSIS — I48.92 UNSPECIFIED ATRIAL FLUTTER: ICD-10-CM

## 2025-01-10 DIAGNOSIS — I35.0 NONRHEUMATIC AORTIC (VALVE) STENOSIS: ICD-10-CM

## 2025-01-10 DIAGNOSIS — I25.10 ATHEROSCLEROTIC HEART DISEASE OF NATIVE CORONARY ARTERY W/OUT ANGINA PECTORIS: ICD-10-CM

## 2025-01-10 DIAGNOSIS — I10 ESSENTIAL (PRIMARY) HYPERTENSION: ICD-10-CM

## 2025-01-10 PROCEDURE — 99214 OFFICE O/P EST MOD 30 MIN: CPT

## 2025-01-10 PROCEDURE — 93000 ELECTROCARDIOGRAM COMPLETE: CPT

## 2025-01-10 PROCEDURE — G2211 COMPLEX E/M VISIT ADD ON: CPT

## 2025-03-04 VITALS
OXYGEN SATURATION: 99 % | SYSTOLIC BLOOD PRESSURE: 166 MMHG | RESPIRATION RATE: 17 BRPM | HEART RATE: 60 BPM | DIASTOLIC BLOOD PRESSURE: 84 MMHG

## 2025-03-04 NOTE — H&P CARDIOLOGY - NSICDXPASTMEDICALHX_GEN_ALL_CORE_FT
PAST MEDICAL HISTORY:  Age-related incipient cataract of both eyes     Hypertension, unspecified type     Nephrolithiasis     Type 2 diabetes mellitus without complication, without long-term current use of insulin

## 2025-03-04 NOTE — H&P CARDIOLOGY - HISTORY OF PRESENT ILLNESS
Patient is a 79y Female PMH of DM, HTN, HLD, nephrolithiasis, CAD (BMS to LAD), pAflutter (on Eliquis, last dose ...). Patient reports worsening AKHTAR and atypical CP. NST showed mild anterior defect, but is being medically managed. Echo 11/2024 showed severe AS. Patient presents today for [facility] with possible intervention for TAVR workup.      Pre cath note:  indication:  [ ] STEMI                [ ] NSTEMI                 [ ] Acute coronary syndrome                   [ ]Unstable Angina   [ ] high risk  [ ] intermediate risk  [ ] low risk                   [ ] Stable Angina     non-invasive testing:                          Date:                     result: [ ] high risk  [ ] intermediate risk  [ ] low risk    Anti- Anginal medications:                    [ ] not used d/t                     [x ] used   (x ) BB     ( ) CCB      ( ) Nitrate   (  ) Ranexa          [ ] not used but strong indication not to use    Ejection Fraction                   [ ] <29            [ ] 30-39%   [ ] 40-49%     [x ]>50%    CHF          [ ] active (within last 14 days on meds   [ ] Chronic (on meds but no exacerbation)  NYHA Functional Class:  (  ) Class I (no limitations)  (  ) Class II (slight limitation)  (  ) Class III (marked limitation)  (  ) Class IV (symptoms at rest)    COPD                   [ ] mild (on chronic bronchodilators)  [ ] moderate (on chronic steroid therapy)      [ ] severe (indication for home O2 or PACO2 >50)    Other risk factors:                     [ ] Previous MI                     [ ] CVA/ stroke                    [ ] carotid stent/ CEA                    [ ] PVD/PAD- (arterial aneurysm, non-palpable pulses, tortuous vessel with inability to insert catheter, infra-renal dissection, renal or subclavian artery stenosis)                    [ ] previous CABG                    [ ] Renal Failure:  on HD  (  ) yes  (  ) no                    [ ] Diabetic  (  ) Type 1  (  ) Type 2                                         (  ) Insulin dependent  (  ) non-insulin dependent                                         (  ) Metformin  ( x ) Januvia  ( x ) Glimepiride  (  ) Glipizide  (  ) Glyburide  (  ) Actos                                         (  ) GLP-1 receptor agonists (Ozempic, Mounjaro, Wegovy, trulicity, Byetta, Victoza)                                         (  ) SGLT2 Inhibitors (Farxiga, Jardiance, Invokana)                                         (  ) Other                Bleeding Risk:     Pre-cath Hydration: (  )  cc IV bolus x 1 over 1 hr followed by:  (  ) NS @ 75cc/hr until procedure (up to 2 hrs) if EF> 50%                                                                                                                         (  ) NS @ 50cc/hr until procedure (up to 2 hrs) if EF< 50%                                      (  ) No precath hydration d/t    RIGHT RADIAL ARTERY EVALUATION:  ROBYN TEST: [] Negative          [] Positive    EF: 55-60%  Date: 11/2024    EKG:   Date: Patient is a 79y Female PMH of DM, HTN, HLD, nephrolithiasis, CAD (BMS to LAD), pAflutter (on Eliquis, last dose Sunday). Patient reports worsening AKHTAR and atypical CP. NST showed mild anterior defect, but is being medically managed. Echo 11/2024 showed severe AS. Patient presents today for [facility] with possible intervention for TAVR workup.      Pre cath note:  indication:  [ ] STEMI                [ ] NSTEMI                 [ ] Acute coronary syndrome                   [ ]Unstable Angina   [ ] high risk  [ ] intermediate risk  [ ] low risk                   [ ] Stable Angina     non-invasive testing:      echo      Date:                     result: [ ] high risk  [ ] intermediate risk  [ ] low risk    Anti- Anginal medications:                    [ ] not used d/t                     [x ] used   (x ) BB     ( ) CCB      ( ) Nitrate   (  ) Ranexa          [ ] not used but strong indication not to use    Ejection Fraction                   [ ] <29            [ ] 30-39%   [ ] 40-49%     [x ]>50%    CHF          [ ] active (within last 14 days on meds   [ ] Chronic (on meds but no exacerbation)  NYHA Functional Class:  (  ) Class I (no limitations)  (x  ) Class II (slight limitation)  (  ) Class III (marked limitation)  (  ) Class IV (symptoms at rest)    COPD                   [ ] mild (on chronic bronchodilators)  [ ] moderate (on chronic steroid therapy)      [ ] severe (indication for home O2 or PACO2 >50)    Other risk factors:                     [ ] Previous MI                     [ ] CVA/ stroke                    [ ] carotid stent/ CEA                    [ ] PVD/PAD- (arterial aneurysm, non-palpable pulses, tortuous vessel with inability to insert catheter, infra-renal dissection, renal or subclavian artery stenosis)                    [ ] previous CABG                    [ ] Renal Failure:  on HD  (  ) yes  (  ) no                    [x ] Diabetic  (  ) Type 1  (x  ) Type 2                                         (  ) Insulin dependent  (  ) non-insulin dependent                                         (  ) Metformin  ( x ) Januvia  ( x ) Glimepiride  (  ) Glipizide  (  ) Glyburide  (  ) Actos                                         (  ) GLP-1 receptor agonists (Ozempic, Mounjaro, Wegovy, trulicity, Byetta, Victoza)                                         (  ) SGLT2 Inhibitors (Farxiga, Jardiance, Invokana)                                         (  ) Other                Bleeding Risk: 4.4%    Pre-cath Hydration: ( x )  cc IV bolus x 2 over 1 hr d/t severe AS      RIGHT RADIAL ARTERY EVALUATION:  ROBYN TEST: [] Negative          [x] Positive    EF: 55-60%  Date: 11/2024    EKG: SR  Date: 68 bpm

## 2025-03-06 ENCOUNTER — OUTPATIENT (OUTPATIENT)
Dept: OUTPATIENT SERVICES | Facility: HOSPITAL | Age: 80
LOS: 1 days | Discharge: HOME | End: 2025-03-06
Payer: MEDICARE

## 2025-03-06 VITALS
RESPIRATION RATE: 18 BRPM | OXYGEN SATURATION: 99 % | DIASTOLIC BLOOD PRESSURE: 80 MMHG | HEART RATE: 68 BPM | SYSTOLIC BLOOD PRESSURE: 130 MMHG

## 2025-03-06 DIAGNOSIS — I25.10 ATHEROSCLEROTIC HEART DISEASE OF NATIVE CORONARY ARTERY WITHOUT ANGINA PECTORIS: ICD-10-CM

## 2025-03-06 DIAGNOSIS — I10 ESSENTIAL (PRIMARY) HYPERTENSION: ICD-10-CM

## 2025-03-06 DIAGNOSIS — Z98.49 CATARACT EXTRACTION STATUS, UNSPECIFIED EYE: Chronic | ICD-10-CM

## 2025-03-06 LAB
ANION GAP SERPL CALC-SCNC: 10 MMOL/L — SIGNIFICANT CHANGE UP (ref 7–14)
ANION GAP SERPL CALC-SCNC: 9 MMOL/L — SIGNIFICANT CHANGE UP (ref 7–14)
BUN SERPL-MCNC: 24 MG/DL — HIGH (ref 10–20)
BUN SERPL-MCNC: 24 MG/DL — HIGH (ref 10–20)
CALCIUM SERPL-MCNC: 8.2 MG/DL — LOW (ref 8.4–10.5)
CALCIUM SERPL-MCNC: 9.8 MG/DL — SIGNIFICANT CHANGE UP (ref 8.4–10.5)
CHLORIDE SERPL-SCNC: 103 MMOL/L — SIGNIFICANT CHANGE UP (ref 98–110)
CHLORIDE SERPL-SCNC: 105 MMOL/L — SIGNIFICANT CHANGE UP (ref 98–110)
CO2 SERPL-SCNC: 24 MMOL/L — SIGNIFICANT CHANGE UP (ref 17–32)
CO2 SERPL-SCNC: 27 MMOL/L — SIGNIFICANT CHANGE UP (ref 17–32)
CREAT SERPL-MCNC: 1 MG/DL — SIGNIFICANT CHANGE UP (ref 0.7–1.5)
CREAT SERPL-MCNC: 1.1 MG/DL — SIGNIFICANT CHANGE UP (ref 0.7–1.5)
EGFR: 50 ML/MIN/1.73M2 — LOW
EGFR: 57 ML/MIN/1.73M2 — LOW
GLUCOSE BLDC GLUCOMTR-MCNC: 129 MG/DL — HIGH (ref 70–99)
GLUCOSE SERPL-MCNC: 120 MG/DL — HIGH (ref 70–99)
GLUCOSE SERPL-MCNC: 129 MG/DL — HIGH (ref 70–99)
HCT VFR BLD CALC: 31.3 % — LOW (ref 37–47)
HCT VFR BLD CALC: 35 % — LOW (ref 37–47)
HGB BLD-MCNC: 11.1 G/DL — LOW (ref 12–16)
HGB BLD-MCNC: 9.8 G/DL — LOW (ref 12–16)
MCHC RBC-ENTMCNC: 27.3 PG — SIGNIFICANT CHANGE UP (ref 27–31)
MCHC RBC-ENTMCNC: 27.5 PG — SIGNIFICANT CHANGE UP (ref 27–31)
MCHC RBC-ENTMCNC: 31.3 G/DL — LOW (ref 32–37)
MCHC RBC-ENTMCNC: 31.7 G/DL — LOW (ref 32–37)
MCV RBC AUTO: 86.6 FL — SIGNIFICANT CHANGE UP (ref 81–99)
MCV RBC AUTO: 87.2 FL — SIGNIFICANT CHANGE UP (ref 81–99)
NRBC BLD AUTO-RTO: 0 /100 WBCS — SIGNIFICANT CHANGE UP (ref 0–0)
NRBC BLD AUTO-RTO: 0 /100 WBCS — SIGNIFICANT CHANGE UP (ref 0–0)
PLATELET # BLD AUTO: 237 K/UL — SIGNIFICANT CHANGE UP (ref 130–400)
PLATELET # BLD AUTO: 312 K/UL — SIGNIFICANT CHANGE UP (ref 130–400)
PMV BLD: 11.3 FL — HIGH (ref 7.4–10.4)
PMV BLD: 11.4 FL — HIGH (ref 7.4–10.4)
POTASSIUM SERPL-MCNC: 3.8 MMOL/L — SIGNIFICANT CHANGE UP (ref 3.5–5)
POTASSIUM SERPL-MCNC: 4.5 MMOL/L — SIGNIFICANT CHANGE UP (ref 3.5–5)
POTASSIUM SERPL-SCNC: 3.8 MMOL/L — SIGNIFICANT CHANGE UP (ref 3.5–5)
POTASSIUM SERPL-SCNC: 4.5 MMOL/L — SIGNIFICANT CHANGE UP (ref 3.5–5)
RBC # BLD: 3.59 M/UL — LOW (ref 4.2–5.4)
RBC # BLD: 4.04 M/UL — LOW (ref 4.2–5.4)
RBC # FLD: 14.9 % — HIGH (ref 11.5–14.5)
RBC # FLD: 14.9 % — HIGH (ref 11.5–14.5)
SODIUM SERPL-SCNC: 136 MMOL/L — SIGNIFICANT CHANGE UP (ref 135–146)
SODIUM SERPL-SCNC: 142 MMOL/L — SIGNIFICANT CHANGE UP (ref 135–146)
WBC # BLD: 8.06 K/UL — SIGNIFICANT CHANGE UP (ref 4.8–10.8)
WBC # BLD: 9.93 K/UL — SIGNIFICANT CHANGE UP (ref 4.8–10.8)
WBC # FLD AUTO: 8.06 K/UL — SIGNIFICANT CHANGE UP (ref 4.8–10.8)
WBC # FLD AUTO: 9.93 K/UL — SIGNIFICANT CHANGE UP (ref 4.8–10.8)

## 2025-03-06 PROCEDURE — C9600: CPT | Mod: LC

## 2025-03-06 PROCEDURE — 82962 GLUCOSE BLOOD TEST: CPT

## 2025-03-06 PROCEDURE — C1725: CPT

## 2025-03-06 PROCEDURE — C1887: CPT

## 2025-03-06 PROCEDURE — C1874: CPT

## 2025-03-06 PROCEDURE — C1894: CPT

## 2025-03-06 PROCEDURE — 92928 PRQ TCAT PLMT NTRAC ST 1 LES: CPT | Mod: LC

## 2025-03-06 PROCEDURE — 93458 L HRT ARTERY/VENTRICLE ANGIO: CPT | Mod: 26,XU

## 2025-03-06 PROCEDURE — 36415 COLL VENOUS BLD VENIPUNCTURE: CPT

## 2025-03-06 PROCEDURE — 93458 L HRT ARTERY/VENTRICLE ANGIO: CPT | Mod: 59

## 2025-03-06 PROCEDURE — 80048 BASIC METABOLIC PNL TOTAL CA: CPT | Mod: 91

## 2025-03-06 PROCEDURE — C1769: CPT

## 2025-03-06 PROCEDURE — 85027 COMPLETE CBC AUTOMATED: CPT

## 2025-03-06 RX ORDER — ROSUVASTATIN CALCIUM 20 MG/1
1 TABLET, FILM COATED ORAL
Refills: 0 | DISCHARGE

## 2025-03-06 RX ORDER — ASPIRIN 325 MG
324 TABLET ORAL ONCE
Refills: 0 | Status: COMPLETED | OUTPATIENT
Start: 2025-03-06 | End: 2025-03-06

## 2025-03-06 RX ORDER — CLOPIDOGREL BISULFATE 75 MG/1
1 TABLET, FILM COATED ORAL
Qty: 30 | Refills: 0
Start: 2025-03-06 | End: 2025-04-04

## 2025-03-06 RX ORDER — FOLIC ACID 1 MG/1
1 TABLET ORAL
Refills: 0 | DISCHARGE

## 2025-03-06 RX ADMIN — Medication 100 MILLILITER(S): at 16:44

## 2025-03-06 RX ADMIN — Medication 324 MILLIGRAM(S): at 11:05

## 2025-03-06 NOTE — ASU PATIENT PROFILE, ADULT - FALL HARM RISK - HARM RISK INTERVENTIONS

## 2025-03-06 NOTE — CHART NOTE - NSCHARTNOTEFT_GEN_A_CORE
PRE-OP DIAGNOSIS:    Unstable Angina    PROCEDURE:     [x] Coronary Angiogram     [x] LHC     [] LVG     [] RHC     [] Intervention (see below)         PHYSICIAN:  Dr Anderson    ASSISTANT: Dr JOSE Ybarra, Dr Jae Levy     PROCEDURE DESCRIPTION:     Consent:      [x] Patient     [] Family Member     []  Used        Anesthesia:     [] General     [x] Sedation     [x] Local        Access & Closure:     [x] 5 Fr Right Radial Artery (D stat closure)     [] Fr Femoral Artery     [] Fr Femoral Vein     [] Fr Brachial Vein       IV Contrast: 22 mL        Intervention: Successful balloon angioplasty with RAMYA X 1 of prox LCx 90% stenosis    Implants: Synergy 4.0 X 20       FINDINGS:     Coronary Dominance: Right      LM: Mild atherosclerosis with calcified 30% stenosis in distal LM    LAD: Ostial 40% stenosis, Moderate diffuse atherosclerosis, patent prior stent  D1: Moderate atherosclerosis, small vessel    CX: Prox LCx 90% stenosis s/p successful intervention  OM1: Small vessel with severe atherosclerosis  OM2: Small vessel with severe atherosclerosis      RCA: Ostial 40% stenosis  RPDA: small vessel with 80% stenosis       LVEDP: 22 mmHg            ESTIMATED BLOOD LOSS: < 10 mL        CONDITION:     [x] Good     [] Fair     [] Critical        SPECIMEN REMOVED: N/A       POST-OP DIAGNOSIS:      [x] 2 Vessel Coronary Artery Disease. Patent Prior LAD stent, LCx 90% stenosis s/p successful intervention and RPDA 80% stenosis - small vessel supplying small vascular territory.         PLAN OF CARE:     [x] D/C Home Today     [x] Medications: Aspirin 81 mg qd, Plavix 75 mg qd, Rosuvastatin 10mg qd, Lopressor 100mg BID, Enalapril 20 mg qd. Resume Eliquis from tomorrow. Pt will remain on triple therapy (Aspirin + Plavix + Eliquis) for 2 weeks, can stop aspirin after that.     [x] IV Fluids: IV NS @ 100 cc/hr for 5 hours PRE-OP DIAGNOSIS:      Unstable Angina    PROCEDURE:     [x] Coronary Angiogram     [x] LHC     [] LVG     [] RHC     [] Intervention (see below)         PHYSICIAN:  Dr Anderson    ASSISTANT: Dr JOSE Ybarra, Dr Jae Levy     PROCEDURE DESCRIPTION:     Consent:      [x] Patient     [] Family Member     []  Used        Anesthesia:     [] General     [x] Sedation     [x] Local        Access & Closure:     [x] 5 Fr Right Radial Artery (D stat closure)     [] Fr Femoral Artery     [] Fr Femoral Vein     [] Fr Brachial Vein       IV Contrast: 22 mL        Intervention: Successful balloon angioplasty with RAMYA X 1 of prox LCx 90% stenosis    Implants: Synergy 4.0 X 20       FINDINGS:     Coronary Dominance: Right      LM: Mild atherosclerosis with calcified 30% stenosis in distal LM    LAD: Ostial 40% stenosis, Moderate diffuse atherosclerosis distally, patent prior stent  D1: Moderate atherosclerosis, small vessel    CX: Prox LCx 90% stenosis s/p successful intervention  OM1: Small vessel with severe atherosclerosis  OM2: Small vessel with severe atherosclerosis      RCA: Ostial 40% stenosis  RPDA: small vessel with 80% stenosis       LVEDP: 22 mmHg     LV to Ao 40 mm Hg gradinet, c/w aortic stenosis       ESTIMATED BLOOD LOSS: < 10 mL        CONDITION:     [x] Good     [] Fair     [] Critical        SPECIMEN REMOVED: N/A       POST-OP DIAGNOSIS:      [x] 2 Vessel Coronary Artery Disease. Patent Prior LAD stent, LCx 90% stenosis s/p successful intervention and RPDA 80% stenosis - small vessel supplying small vascular territory.         PLAN OF CARE:     [x] D/C Home Today     [x] Medications: Aspirin 81 mg qd, Plavix 75 mg qd, Rosuvastatin 10mg qd, Lopressor 100mg BID, Enalapril 20 mg qd. Resume Eliquis from tomorrow. Pt will remain on triple therapy (Aspirin + Plavix + Eliquis) for 2 weeks, can stop aspirin after that.     [x] IV Fluids: IV NS @ 100 cc/hr for 5 hours

## 2025-03-06 NOTE — ASU PREOP CHECKLIST - BP NONINVASIVE DIASTOLIC (MM HG)
84
[facility] [phone] [place] [address] [phone], [facility]

## 2025-03-06 NOTE — ASU DISCHARGE PLAN (ADULT/PEDIATRIC) - ASU DC SPECIAL INSTRUCTIONSFT
Discharge Instructions as follows:  - Continue medical regimen as prescribed to prevent chest pain.  - If you are diabetic and taking medication containing Metformin, do not take them for 48 hours after the procedure  - Continue dual anti-platelet therapy, beta blocker, Statin   --> can resume Eliquis tomorrow 3/7 in PM  --> Stop ASA after 2 weeks but continue taking Plavix /Eliquis  - Instructed to call 911 if chest pain, shortness of breath or bleeding from access site.  - No heavy lifting > 10lbs x 1 week.  - No driving x 24 hours.  - No baths, swimming pools x 1 week, may shower  - Low sodium low fat low cholesterol diet  - Follow-up with Cardiologist in 1-2 weeks after discharge  - Soreness or tenderness at the site is possible, it will diminish over time. You may take Tylenol every 4-6 hours as needed. Nothing stronger is needed. NO Motrin/Ibuprofen  - Any questions call cardiac cath lab [phone]

## 2025-03-06 NOTE — ASU DISCHARGE PLAN (ADULT/PEDIATRIC) - CARE PROVIDER_API CALL
Giancarlo Anderson Y  Interventional Cardiology  [address] [place] [address], [address]  [place] [address]  Phone: [phone]  Fax: [phone]  Follow Up Time: 2 weeks  
Yes

## 2025-03-06 NOTE — ASU DISCHARGE PLAN (ADULT/PEDIATRIC) - NS MD DC FALL RISK RISK
For information on Fall & Injury Prevention, visit: [email or web address] OR  [email or web address] OR  https://www.cdc.gov/steadi/patient.html

## 2025-03-06 NOTE — PROGRESS NOTE ADULT - SUBJECTIVE AND OBJECTIVE BOX
Cardiology Follow up    MARYANN LOFTON   79y Female  PAST MEDICAL & SURGICAL HISTORY:  Type 2 diabetes mellitus without complication, without long-term current use of insulin      Hypertension, unspecified type      Nephrolithiasis      Age-related incipient cataract of both eyes      H/O cataract removal with insertion of prosthetic lens           HPI:  Patient is a 79y Female PMH of DM, HTN, HLD, nephrolithiasis, CAD (BMS to LAD), pAflutter (on Eliquis, last dose Sunday). Patient reports worsening AKHTAR and atypical CP. NST showed mild anterior defect, but is being medically managed. Echo 11/2024 showed severe AS. Patient presents today for [facility] with possible intervention for TAVR workup.      Pre cath note:  indication:  [ ] STEMI                [ ] NSTEMI                 [ ] Acute coronary syndrome                   [ ]Unstable Angina   [ ] high risk  [ ] intermediate risk  [ ] low risk                   [ ] Stable Angina     non-invasive testing:      echo      Date:                     result: [ ] high risk  [ ] intermediate risk  [ ] low risk    Anti- Anginal medications:                    [ ] not used d/t                     [x ] used   (x ) BB     ( ) CCB      ( ) Nitrate   (  ) Ranexa          [ ] not used but strong indication not to use    Ejection Fraction                   [ ] <29            [ ] 30-39%   [ ] 40-49%     [x ]>50%    CHF          [ ] active (within last 14 days on meds   [ ] Chronic (on meds but no exacerbation)  NYHA Functional Class:  (  ) Class I (no limitations)  (x  ) Class II (slight limitation)  (  ) Class III (marked limitation)  (  ) Class IV (symptoms at rest)    COPD                   [ ] mild (on chronic bronchodilators)  [ ] moderate (on chronic steroid therapy)      [ ] severe (indication for home O2 or PACO2 >50)    Other risk factors:                     [ ] Previous MI                     [ ] CVA/ stroke                    [ ] carotid stent/ CEA                    [ ] PVD/PAD- (arterial aneurysm, non-palpable pulses, tortuous vessel with inability to insert catheter, infra-renal dissection, renal or subclavian artery stenosis)                    [ ] previous CABG                    [ ] Renal Failure:  on HD  (  ) yes  (  ) no                    [x ] Diabetic  (  ) Type 1  (x  ) Type 2                                         (  ) Insulin dependent  (  ) non-insulin dependent                                         (  ) Metformin  ( x ) Januvia  ( x ) Glimepiride  (  ) Glipizide  (  ) Glyburide  (  ) Actos                                         (  ) GLP-1 receptor agonists (Ozempic, Mounjaro, Wegovy, trulicity, Byetta, Victoza)                                         (  ) SGLT2 Inhibitors (Farxiga, Jardiance, Invokana)                                         (  ) Other                Bleeding Risk: 4.4%    Pre-cath Hydration: ( x )  cc IV bolus x 2 over 1 hr d/t severe AS      RIGHT RADIAL ARTERY EVALUATION:  ROBYN TEST: [] Negative          [x] Positive    EF: 55-60%  Date: 11/2024    EKG: SR  Date: 68 bpm (04 Mar 2025 12:21)    Allergies    Sulfacetamide Sodium (Unknown)  Ancef (Anaphylaxis)    Intolerances      Patient seen and examined at bedside.   Patient without complaints.   Denies CP, SOB, palpitations, or dizziness    Vital Signs   HR: 57  BP: 128/59  SpO2: 97 % on RA        MEDICATIONS  (STANDING):  chlorhexidine 4% Liquid 1 Application(s) Topical once  sodium chloride 0.9% Bolus 250 milliLiter(s) IV Bolus once  sodium chloride 0.9%. 1000 milliLiter(s) (100 mL/Hr) IV Continuous <Continuous>    MEDICATIONS  (PRN):      REVIEW OF SYSTEMS:          All negative except as mentioned in HPI    PHYSICAL EXAM:           CONSTITUTIONAL: Well-developed; well-nourished; in no acute distress  	SKIN: warm, dry  	HEAD: Normocephalic; atraumatic  	EYES: PERRL.  	ENT: No nasal discharge, airway clear, mucous membranes moist  	NECK: Supple; non tender.  	CARD: +S1, +S2, no murmurs, gallops, or rubs. Regular rate and rhythm    	RESP: No wheezes, rales or rhonchi. CTA B/L  	ABD: soft ntnd, + BS x 4 quadrants  	EXT: moves all extremities,  no clubbing, cyanosis or edema  	NEURO: Alert and oriented x3, no focal deficits          PSYCH: Cooperative, appropriate          VASCULAR:  + Rad / + PTs / + DPs          EXTREMITY:             	   Right Radial: Dressing D/C/I, DSTAT in place, access site soft, no hematoma, no pain, + pulses, no sign of infection, no numbness            ECG: pending                                                                                                               LABS:                        11.1   9.93  )-----------( 312      ( 06 Mar 2025 10:53 )             35.0     03-06    142  |  105  |  24[H]  ----------------------------<  120[H]  4.5   |  27  |  1.1    Ca    9.8      06 Mar 2025 10:53    A/P:  I discussed the case with Cardiologist Dr. [facility]  and recommend the following:    S/P PCI:    s/p RAMYA x 1 pLCx    	     Continue DAPT ( Aspirin 81 mg PO Daily and Plavix 75 mg daily ),  B-Blocker, Statin Therapy, Eliquis, Pantoprazole                   Patient given 30 day supply of ( Aspirin 81 mg daily and Plavix 75 mg daily ) to take at home                   Restart Eliquis tomorrow 3/7 PM                   HOLD Metformin 48hours post cath, can resume Edwin 3/9 in AM                   STOP ASA 81mg po daily after 1 week, but continue with Plavix/Eliquis, d/t increased bleeding risk on triple therapy                   START Pantoprazole 40mg po daily x 30 days                   CBC at 1600                   EKG prior to d/c at 1600                   NS at 100 ml/hr x 5 hrs                   OOB Ambulate with assistance                   Monitor access site/ distal pulses                   Patient agreeing to take DAPT for at least one year or as directed by cardiologist                    Pt given instructions on importance of taking antiplatelet medication or risk acute stent thrombosis/death                   Post cath instructions, access site care and activity restrictions reviewed with patient                     Discussed with patient to return to hospital if experience chest pain, shortness breath, dizziness and site bleeding                   Aggressive risk factor modification, diet counseling, smoking cessation discussed with patient                    Benefits of cardiac rehab discussed with patient                    Cardiac rehab info provided/referral and communication to cardiac rehab completed                   Patient instructed to call cardiac rehab and make initial appointment after first follow-up visit with Cardiologist                    Can discharge patient at 1800 from cardiac standpoint after ambulating without symptoms, access site wnl, labs and ECG reviewed                    Follow up with Cardiology Dr. [facility]  in two weeks.  Instructed to call and make an appointment                                       Cardiology Follow up    MARYANN LOFTON     79y Female  PAST MEDICAL & SURGICAL HISTORY:  Type 2 diabetes mellitus without complication, without long-term current use of insulin      Hypertension, unspecified type      Nephrolithiasis      Age-related incipient cataract of both eyes      H/O cataract removal with insertion of prosthetic lens           HPI:  Patient is a 79y Female PMH of DM, HTN, HLD, nephrolithiasis, CAD (BMS to LAD), pAflutter (on Eliquis, last dose Sunday). Patient reports worsening AKHTAR and atypical CP. NST showed mild anterior defect, but is being medically managed. Echo 11/2024 showed severe AS. Patient presents today for [facility] with possible intervention for TAVR workup.      Pre cath note:  indication:  [ ] STEMI                [ ] NSTEMI                 [ ] Acute coronary syndrome                   [ ]Unstable Angina   [ ] high risk  [ ] intermediate risk  [ ] low risk                   [ ] Stable Angina     non-invasive testing:      echo      Date:                     result: [ ] high risk  [ ] intermediate risk  [ ] low risk    Anti- Anginal medications:                    [ ] not used d/t                     [x ] used   (x ) BB     ( ) CCB      ( ) Nitrate   (  ) Ranexa          [ ] not used but strong indication not to use    Ejection Fraction                   [ ] <29            [ ] 30-39%   [ ] 40-49%     [x ]>50%    CHF          [ ] active (within last 14 days on meds   [ ] Chronic (on meds but no exacerbation)  NYHA Functional Class:  (  ) Class I (no limitations)  (x  ) Class II (slight limitation)  (  ) Class III (marked limitation)  (  ) Class IV (symptoms at rest)    COPD                   [ ] mild (on chronic bronchodilators)  [ ] moderate (on chronic steroid therapy)      [ ] severe (indication for home O2 or PACO2 >50)    Other risk factors:                     [ ] Previous MI                     [ ] CVA/ stroke                    [ ] carotid stent/ CEA                    [ ] PVD/PAD- (arterial aneurysm, non-palpable pulses, tortuous vessel with inability to insert catheter, infra-renal dissection, renal or subclavian artery stenosis)                    [ ] previous CABG                    [ ] Renal Failure:  on HD  (  ) yes  (  ) no                    [x ] Diabetic  (  ) Type 1  (x  ) Type 2                                         (  ) Insulin dependent  (  ) non-insulin dependent                                         (  ) Metformin  ( x ) Januvia  ( x ) Glimepiride  (  ) Glipizide  (  ) Glyburide  (  ) Actos                                         (  ) GLP-1 receptor agonists (Ozempic, Mounjaro, Wegovy, trulicity, Byetta, Victoza)                                         (  ) SGLT2 Inhibitors (Farxiga, Jardiance, Invokana)                                         (  ) Other                Bleeding Risk: 4.4%    Pre-cath Hydration: ( x )  cc IV bolus x 2 over 1 hr d/t severe AS      RIGHT RADIAL ARTERY EVALUATION:  ROBYN TEST: [] Negative          [x] Positive    EF: 55-60%  Date: 11/2024    EKG: SR  Date: 68 bpm (04 Mar 2025 12:21)    Allergies    Sulfacetamide Sodium (Unknown)  Ancef (Anaphylaxis)    Intolerances      Patient seen and examined at bedside.   Patient without complaints.   Denies CP, SOB, palpitations, or dizziness    Vital Signs   HR: 57  BP: 128/59  SpO2: 97 % on RA        MEDICATIONS  (STANDING):  chlorhexidine 4% Liquid 1 Application(s) Topical once  sodium chloride 0.9% Bolus 250 milliLiter(s) IV Bolus once  sodium chloride 0.9%. 1000 milliLiter(s) (100 mL/Hr) IV Continuous <Continuous>    MEDICATIONS  (PRN):      REVIEW OF SYSTEMS:          All negative except as mentioned in HPI    PHYSICAL EXAM:           CONSTITUTIONAL: Well-developed; well-nourished; in no acute distress  	SKIN: warm, dry  	HEAD: Normocephalic; atraumatic  	EYES: PERRL.  	ENT: No nasal discharge, airway clear, mucous membranes moist  	NECK: Supple; non tender.  	CARD: +S1, +S2, no murmurs, gallops, or rubs. Regular rate and rhythm    	RESP: No wheezes, rales or rhonchi. CTA B/L  	ABD: soft ntnd, + BS x 4 quadrants  	EXT: moves all extremities,  no clubbing, cyanosis or edema  	NEURO: Alert and oriented x3, no focal deficits          PSYCH: Cooperative, appropriate          VASCULAR:  + Rad / + PTs / + DPs          EXTREMITY:             	   Right Radial: Dressing D/C/I, DSTAT in place, access site soft, no hematoma, no pain, + pulses, no sign of infection, no numbness            ECG: pending                                                                                                               LABS:                        11.1   9.93  )-----------( 312      ( 06 Mar 2025 10:53 )             35.0     03-06    142  |  105  |  24[H]  ----------------------------<  120[H]  4.5   |  27  |  1.1    Ca    9.8      06 Mar 2025 10:53    A/P:  I discussed the case with Cardiologist Dr. [facility]  and recommend the following:    S/P PCI:    s/p RAMYA x 1 pLCx    	     Continue DAPT ( Aspirin 81 mg PO Daily and Plavix 75 mg daily ),  B-Blocker, Statin Therapy, Eliquis, Pantoprazole                   Patient given 30 day supply of ( Aspirin 81 mg daily and Plavix 75 mg daily ) to take at home                   Restart Eliquis tomorrow 3/7 PM                   HOLD Metformin 48hours post cath, can resume Edwin 3/9 in AM                   STOP ASA 81mg po daily after 1 week, but continue with Plavix/Eliquis, d/t increased bleeding risk on triple therapy                   START Pantoprazole 40mg po daily x 30 days                   CBC at 1600                   EKG prior to d/c at 1600                   NS at 100 ml/hr x 5 hrs                   OOB Ambulate with assistance                   Monitor access site/ distal pulses                   Patient agreeing to take DAPT for at least one year or as directed by cardiologist                    Pt given instructions on importance of taking antiplatelet medication or risk acute stent thrombosis/death                   Post cath instructions, access site care and activity restrictions reviewed with patient                     Discussed with patient to return to hospital if experience chest pain, shortness breath, dizziness and site bleeding                   Aggressive risk factor modification, diet counseling, smoking cessation discussed with patient                    Benefits of cardiac rehab discussed with patient                    Cardiac rehab info provided/referral and communication to cardiac rehab completed                   Patient instructed to call cardiac rehab and make initial appointment after first follow-up visit with Cardiologist                    Can discharge patient at 1800 from cardiac standpoint after ambulating without symptoms, access site wnl, labs and ECG reviewed                    Follow up with Cardiology Dr. [facility]  in two weeks.  Instructed to call and make an appointment                                       Cardiology Follow up    MARYANN LOFTON     79y Female  PAST MEDICAL & SURGICAL HISTORY:  Type 2 diabetes mellitus without complication, without long-term current use of insulin      Hypertension, unspecified type      Nephrolithiasis      Age-related incipient cataract of both eyes      H/O cataract removal with insertion of prosthetic lens           HPI:  Patient is a 79y Female PMH of DM, HTN, HLD, nephrolithiasis, CAD (BMS to LAD), pAflutter (on Eliquis, last dose Sunday). Patient reports worsening AKHTAR and atypical CP. NST showed mild anterior defect, but is being medically managed. Echo 11/2024 showed severe AS. Patient presents today for [facility] with possible intervention for TAVR workup.      Pre cath note:  indication:  [ ] STEMI                [ ] NSTEMI                 [ ] Acute coronary syndrome                   [ ]Unstable Angina   [ ] high risk  [ ] intermediate risk  [ ] low risk                   [ ] Stable Angina     non-invasive testing:      echo      Date:                     result: [ ] high risk  [ ] intermediate risk  [ ] low risk    Anti- Anginal medications:                    [ ] not used d/t                     [x ] used   (x ) BB     ( ) CCB      ( ) Nitrate   (  ) Ranexa          [ ] not used but strong indication not to use    Ejection Fraction                   [ ] <29            [ ] 30-39%   [ ] 40-49%     [x ]>50%    CHF          [ ] active (within last 14 days on meds   [ ] Chronic (on meds but no exacerbation)  NYHA Functional Class:  (  ) Class I (no limitations)  (x  ) Class II (slight limitation)  (  ) Class III (marked limitation)  (  ) Class IV (symptoms at rest)    COPD                   [ ] mild (on chronic bronchodilators)  [ ] moderate (on chronic steroid therapy)      [ ] severe (indication for home O2 or PACO2 >50)    Other risk factors:                     [ ] Previous MI                     [ ] CVA/ stroke                    [ ] carotid stent/ CEA                    [ ] PVD/PAD- (arterial aneurysm, non-palpable pulses, tortuous vessel with inability to insert catheter, infra-renal dissection, renal or subclavian artery stenosis)                    [ ] previous CABG                    [ ] Renal Failure:  on HD  (  ) yes  (  ) no                    [x ] Diabetic  (  ) Type 1  (x  ) Type 2                                         (  ) Insulin dependent  (  ) non-insulin dependent                                         (  ) Metformin  ( x ) Januvia  ( x ) Glimepiride  (  ) Glipizide  (  ) Glyburide  (  ) Actos                                         (  ) GLP-1 receptor agonists (Ozempic, Mounjaro, Wegovy, trulicity, Byetta, Victoza)                                         (  ) SGLT2 Inhibitors (Farxiga, Jardiance, Invokana)                                         (  ) Other                Bleeding Risk: 4.4%    Pre-cath Hydration: ( x )  cc IV bolus x 2 over 1 hr d/t severe AS      RIGHT RADIAL ARTERY EVALUATION:  ROBYN TEST: [] Negative          [x] Positive    EF: 55-60%  Date: 11/2024    EKG: SR  Date: 68 bpm (04 Mar 2025 12:21)    Allergies    Sulfacetamide Sodium (Unknown)  Ancef (Anaphylaxis)    Intolerances      Patient seen and examined at bedside.   Patient without complaints.   Denies CP, SOB, palpitations, or dizziness    Vital Signs   HR: 57  BP: 128/59  SpO2: 97 % on RA        MEDICATIONS  (STANDING):  chlorhexidine 4% Liquid 1 Application(s) Topical once  sodium chloride 0.9% Bolus 250 milliLiter(s) IV Bolus once  sodium chloride 0.9%. 1000 milliLiter(s) (100 mL/Hr) IV Continuous <Continuous>    MEDICATIONS  (PRN):      REVIEW OF SYSTEMS:          All negative except as mentioned in HPI    PHYSICAL EXAM:           CONSTITUTIONAL: Well-developed; well-nourished; in no acute distress  	SKIN: warm, dry  	HEAD: Normocephalic; atraumatic  	EYES: PERRL.  	ENT: No nasal discharge, airway clear, mucous membranes moist  	NECK: Supple; non tender.  	CARD: +S1, +S2, no murmurs, gallops, or rubs. Regular rate and rhythm    	RESP: No wheezes, rales or rhonchi. CTA B/L  	ABD: soft ntnd, + BS x 4 quadrants  	EXT: moves all extremities,  no clubbing, cyanosis or edema  	NEURO: Alert and oriented x3, no focal deficits          PSYCH: Cooperative, appropriate          VASCULAR:  + Rad / + PTs / + DPs          EXTREMITY:             	   Right Radial: Dressing D/C/I, DSTAT in place, access site soft, no hematoma, no pain, + pulses, no sign of infection, no numbness            ECG: pending                                                                                                               LABS:                        11.1   9.93  )-----------( 312      ( 06 Mar 2025 10:53 )             35.0     03-06    142  |  105  |  24[H]  ----------------------------<  120[H]  4.5   |  27  |  1.1    Ca    9.8      06 Mar 2025 10:53    A/P:  I discussed the case with Cardiologist Dr. [facility]  and recommend the following:    S/P PCI:    s/p RAMYA x 1 pLCx    	     Continue DAPT ( Aspirin 81 mg PO Daily and Plavix 75 mg daily ),  B-Blocker, Statin Therapy, Eliquis, Pantoprazole                   Patient given 30 day supply of ( Aspirin 81 mg daily and Plavix 75 mg daily ) to take at home                   Restart Eliquis tomorrow 3/7 PM                   HOLD Metformin 48hours post cath, can resume Edwin 3/9 in AM                   STOP ASA 81mg po daily after 1 week, but continue with Plavix/Eliquis, d/t increased bleeding risk on triple therapy                   START Pantoprazole 40mg po daily x 30 days                   CBC at 1600 - Hb stable @ 9.8 from 11.1                   EKG prior to d/c at 1600 - no acute changes                   NS at 100 ml/hr x 5 hrs                   OOB Ambulate with assistance                   Monitor access site/ distal pulses                   Patient agreeing to take DAPT for at least one year or as directed by cardiologist                    Pt given instructions on importance of taking antiplatelet medication or risk acute stent thrombosis/death                   Post cath instructions, access site care and activity restrictions reviewed with patient                     Discussed with patient to return to hospital if experience chest pain, shortness breath, dizziness and site bleeding                   Aggressive risk factor modification, diet counseling, smoking cessation discussed with patient                    Benefits of cardiac rehab discussed with patient                    Cardiac rehab info provided/referral and communication to cardiac rehab completed                   Patient instructed to call cardiac rehab and make initial appointment after first follow-up visit with Cardiologist                    Can discharge patient at 1800 from cardiac standpoint after ambulating without symptoms, access site wnl, labs and ECG reviewed                    Follow up with Cardiology Dr. [facility]  in two weeks.  Instructed to call and make an appointment

## 2025-03-06 NOTE — ASU DISCHARGE PLAN (ADULT/PEDIATRIC) - FINANCIAL ASSISTANCE
[facility] provides services at a reduced cost to those who are determined to be eligible through [facility]’s financial assistance program. Information regarding [facility]’s financial assistance program can be found by going to [email or web address] or by calling [phone].

## 2025-03-10 DIAGNOSIS — E78.5 HYPERLIPIDEMIA, UNSPECIFIED: ICD-10-CM

## 2025-03-10 DIAGNOSIS — I10 ESSENTIAL (PRIMARY) HYPERTENSION: ICD-10-CM

## 2025-03-10 DIAGNOSIS — I25.110 ATHEROSCLEROTIC HEART DISEASE OF NATIVE CORONARY ARTERY WITH UNSTABLE ANGINA PECTORIS: ICD-10-CM

## 2025-03-10 DIAGNOSIS — Z95.5 PRESENCE OF CORONARY ANGIOPLASTY IMPLANT AND GRAFT: ICD-10-CM

## 2025-05-23 ENCOUNTER — APPOINTMENT (OUTPATIENT)
Dept: CARDIOLOGY | Facility: CLINIC | Age: 80
End: 2025-05-23
Payer: MEDICARE

## 2025-05-23 DIAGNOSIS — I10 ESSENTIAL (PRIMARY) HYPERTENSION: ICD-10-CM

## 2025-05-23 DIAGNOSIS — I25.10 ATHEROSCLEROTIC HEART DISEASE OF NATIVE CORONARY ARTERY W/OUT ANGINA PECTORIS: ICD-10-CM

## 2025-05-23 DIAGNOSIS — I48.92 UNSPECIFIED ATRIAL FLUTTER: ICD-10-CM

## 2025-05-23 DIAGNOSIS — I35.0 NONRHEUMATIC AORTIC (VALVE) STENOSIS: ICD-10-CM

## 2025-05-23 PROCEDURE — 99214 OFFICE O/P EST MOD 30 MIN: CPT

## 2025-05-23 PROCEDURE — 93000 ELECTROCARDIOGRAM COMPLETE: CPT

## 2025-05-23 PROCEDURE — G2211 COMPLEX E/M VISIT ADD ON: CPT

## 2025-07-09 ENCOUNTER — RESULT CHARGE (OUTPATIENT)
Age: 80
End: 2025-07-09

## 2025-07-09 ENCOUNTER — APPOINTMENT (OUTPATIENT)
Dept: CARDIOLOGY | Facility: CLINIC | Age: 80
End: 2025-07-09
Payer: MEDICARE

## 2025-07-09 VITALS
HEIGHT: 67 IN | DIASTOLIC BLOOD PRESSURE: 70 MMHG | OXYGEN SATURATION: 97 % | SYSTOLIC BLOOD PRESSURE: 160 MMHG | WEIGHT: 172 LBS | RESPIRATION RATE: 18 BRPM | HEART RATE: 68 BPM | BODY MASS INDEX: 27 KG/M2

## 2025-07-09 PROCEDURE — 99214 OFFICE O/P EST MOD 30 MIN: CPT

## 2025-07-09 PROCEDURE — 93000 ELECTROCARDIOGRAM COMPLETE: CPT

## 2025-07-09 PROCEDURE — G2211 COMPLEX E/M VISIT ADD ON: CPT

## 2025-07-09 RX ORDER — CLOPIDOGREL BISULFATE 75 MG/1
75 TABLET, FILM COATED ORAL DAILY
Refills: 0 | Status: ACTIVE | COMMUNITY

## 2025-07-09 RX ORDER — AMIODARONE HYDROCHLORIDE 200 MG/1
200 TABLET ORAL
Refills: 0 | Status: ACTIVE | COMMUNITY

## 2025-07-09 RX ORDER — METFORMIN HYDROCHLORIDE 500 MG/1
500 TABLET, COATED ORAL
Refills: 0 | Status: ACTIVE | COMMUNITY

## 2025-07-15 ENCOUNTER — RX RENEWAL (OUTPATIENT)
Age: 80
End: 2025-07-15

## 2025-07-25 ENCOUNTER — NON-APPOINTMENT (OUTPATIENT)
Age: 80
End: 2025-07-25

## 2025-08-13 ENCOUNTER — APPOINTMENT (OUTPATIENT)
Dept: CARDIOLOGY | Facility: CLINIC | Age: 80
End: 2025-08-13
Payer: MEDICARE

## 2025-08-13 VITALS
SYSTOLIC BLOOD PRESSURE: 120 MMHG | HEART RATE: 78 BPM | WEIGHT: 176 LBS | RESPIRATION RATE: 18 BRPM | HEIGHT: 67 IN | DIASTOLIC BLOOD PRESSURE: 45 MMHG | OXYGEN SATURATION: 99 % | BODY MASS INDEX: 27.62 KG/M2

## 2025-08-13 DIAGNOSIS — I10 ESSENTIAL (PRIMARY) HYPERTENSION: ICD-10-CM

## 2025-08-13 DIAGNOSIS — I48.92 UNSPECIFIED ATRIAL FLUTTER: ICD-10-CM

## 2025-08-13 DIAGNOSIS — I35.0 NONRHEUMATIC AORTIC (VALVE) STENOSIS: ICD-10-CM

## 2025-08-13 DIAGNOSIS — I25.10 ATHEROSCLEROTIC HEART DISEASE OF NATIVE CORONARY ARTERY W/OUT ANGINA PECTORIS: ICD-10-CM

## 2025-08-13 PROCEDURE — 99214 OFFICE O/P EST MOD 30 MIN: CPT

## 2025-08-13 PROCEDURE — G2211 COMPLEX E/M VISIT ADD ON: CPT

## 2025-08-13 PROCEDURE — 93000 ELECTROCARDIOGRAM COMPLETE: CPT

## 2025-09-17 ENCOUNTER — APPOINTMENT (OUTPATIENT)
Dept: CARDIOLOGY | Facility: CLINIC | Age: 80
End: 2025-09-17